# Patient Record
Sex: FEMALE | Race: WHITE | NOT HISPANIC OR LATINO
[De-identification: names, ages, dates, MRNs, and addresses within clinical notes are randomized per-mention and may not be internally consistent; named-entity substitution may affect disease eponyms.]

---

## 2018-11-02 PROBLEM — Z00.00 ENCOUNTER FOR PREVENTIVE HEALTH EXAMINATION: Status: ACTIVE | Noted: 2018-11-02

## 2018-11-18 ENCOUNTER — RECORD ABSTRACTING (OUTPATIENT)
Age: 73
End: 2018-11-18

## 2018-11-18 DIAGNOSIS — K29.50 UNSPECIFIED CHRONIC GASTRITIS W/OUT BLEEDING: ICD-10-CM

## 2018-11-18 DIAGNOSIS — K57.30 DIVERTICULOSIS OF LARGE INTESTINE W/OUT PERFORATION OR ABSCESS W/OUT BLEEDING: ICD-10-CM

## 2018-11-18 DIAGNOSIS — Z71.89 OTHER SPECIFIED COUNSELING: ICD-10-CM

## 2018-11-18 DIAGNOSIS — Z80.6 FAMILY HISTORY OF LEUKEMIA: ICD-10-CM

## 2018-11-18 RX ORDER — B-COMPLEX WITH VITAMIN C
TABLET ORAL DAILY
Refills: 0 | Status: ACTIVE | COMMUNITY

## 2018-11-18 RX ORDER — ASPIRIN 81 MG/1
81 TABLET, CHEWABLE ORAL DAILY
Refills: 0 | Status: ACTIVE | COMMUNITY

## 2018-12-10 ENCOUNTER — APPOINTMENT (OUTPATIENT)
Dept: GERIATRICS | Facility: CLINIC | Age: 73
End: 2018-12-10

## 2019-01-10 ENCOUNTER — RESULT REVIEW (OUTPATIENT)
Age: 74
End: 2019-01-10

## 2019-01-14 ENCOUNTER — APPOINTMENT (OUTPATIENT)
Dept: GERIATRICS | Facility: CLINIC | Age: 74
End: 2019-01-14
Payer: MEDICARE

## 2019-01-14 VITALS
OXYGEN SATURATION: 98 % | SYSTOLIC BLOOD PRESSURE: 142 MMHG | HEART RATE: 71 BPM | DIASTOLIC BLOOD PRESSURE: 80 MMHG | TEMPERATURE: 97.8 F | HEIGHT: 72 IN

## 2019-01-14 PROCEDURE — 99213 OFFICE O/P EST LOW 20 MIN: CPT

## 2019-03-13 ENCOUNTER — RECORD ABSTRACTING (OUTPATIENT)
Age: 74
End: 2019-03-13

## 2019-03-13 LAB — CYTOLOGY CVX/VAG DOC THIN PREP: NORMAL

## 2019-05-10 ENCOUNTER — RESULT REVIEW (OUTPATIENT)
Age: 74
End: 2019-05-10

## 2019-05-13 ENCOUNTER — APPOINTMENT (OUTPATIENT)
Dept: GERIATRICS | Facility: CLINIC | Age: 74
End: 2019-05-13
Payer: MEDICARE

## 2019-05-13 VITALS
HEART RATE: 80 BPM | OXYGEN SATURATION: 99 % | HEIGHT: 66 IN | DIASTOLIC BLOOD PRESSURE: 76 MMHG | TEMPERATURE: 97.9 F | SYSTOLIC BLOOD PRESSURE: 140 MMHG

## 2019-05-13 PROCEDURE — 99214 OFFICE O/P EST MOD 30 MIN: CPT

## 2019-05-14 NOTE — PHYSICAL EXAM
[General Appearance - Alert] : alert [General Appearance - In No Acute Distress] : in no acute distress [General Appearance - Well-Appearing] : healthy appearing [Sclera] : the sclera and conjunctiva were normal [PERRL With Normal Accommodation] : pupils were equal in size, round, and reactive to light [Optic Disc Abnormality] : the optic disc were normal in size and color [Extraocular Movements] : extraocular movements were intact [Neck Appearance] : the appearance of the neck was normal [Neck Cervical Mass (___cm)] : no neck mass was observed [Respiration, Rhythm And Depth] : normal respiratory rhythm and effort [Chest Palpation] : palpation of the chest revealed no abnormalities [Auscultation Breath Sounds / Voice Sounds] : lungs were clear to auscultation bilaterally [Lungs Percussion] : the lungs were normal to percussion [Heart Rate And Rhythm] : heart rate was normal and rhythm regular [Apical Impulse] : the apical impulse was normal [Heart Sounds] : normal S1 and S2 [Heart Sounds Gallop] : no gallops [Heart Sounds Pericardial Friction Rub] : no pericardial rub [Murmurs] : no murmurs [Full Pulse] : the pedal pulses are present [Arterial Pulses Carotid] : carotid pulses were normal with no bruits [Edema] : there was no peripheral edema [Arterial Pulses Femoral] : femoral pulses were normal without bruits [Bowel Sounds] : normal bowel sounds [Abdomen Soft] : soft [Abdomen Mass (___ Cm)] : no abdominal mass palpated [Nail Clubbing] : no clubbing  or cyanosis of the fingernails [Abnormal Walk] : normal gait [Involuntary Movements] : no involuntary movements were seen [Musculoskeletal - Swelling] : no joint swelling seen [Motor Tone] : muscle strength and tone were normal [] : no rash [Skin Lesions] : no skin lesions [Deep Tendon Reflexes (DTR)] : deep tendon reflexes were 2+ and symmetric [Cranial Nerves] : cranial nerves 2-12 were intact [Motor Exam] : the motor exam was normal [Sensation] : the sensory exam was normal to light touch and pinprick [No Focal Deficits] : no focal deficits [Affect] : the affect was normal [Impaired Insight] : insight and judgment were intact [Mood] : the mood was normal [Memory Recent] : recent memory was not impaired

## 2019-05-14 NOTE — PHYSICAL EXAM
[General Appearance - Alert] : alert [General Appearance - In No Acute Distress] : in no acute distress [General Appearance - Well-Appearing] : healthy appearing [Sclera] : the sclera and conjunctiva were normal [Optic Disc Abnormality] : the optic disc were normal in size and color [PERRL With Normal Accommodation] : pupils were equal in size, round, and reactive to light [Extraocular Movements] : extraocular movements were intact [Neck Appearance] : the appearance of the neck was normal [Neck Cervical Mass (___cm)] : no neck mass was observed [Respiration, Rhythm And Depth] : normal respiratory rhythm and effort [Chest Palpation] : palpation of the chest revealed no abnormalities [Auscultation Breath Sounds / Voice Sounds] : lungs were clear to auscultation bilaterally [Lungs Percussion] : the lungs were normal to percussion [Heart Rate And Rhythm] : heart rate was normal and rhythm regular [Apical Impulse] : the apical impulse was normal [Heart Sounds Gallop] : no gallops [Heart Sounds] : normal S1 and S2 [Murmurs] : no murmurs [Heart Sounds Pericardial Friction Rub] : no pericardial rub [Full Pulse] : the pedal pulses are present [Arterial Pulses Femoral] : femoral pulses were normal without bruits [Arterial Pulses Carotid] : carotid pulses were normal with no bruits [Edema] : there was no peripheral edema [Bowel Sounds] : normal bowel sounds [Abdomen Mass (___ Cm)] : no abdominal mass palpated [Abdomen Soft] : soft [Involuntary Movements] : no involuntary movements were seen [Nail Clubbing] : no clubbing  or cyanosis of the fingernails [Abnormal Walk] : normal gait [Musculoskeletal - Swelling] : no joint swelling seen [Motor Tone] : muscle strength and tone were normal [Skin Lesions] : no skin lesions [] : no rash [Cranial Nerves] : cranial nerves 2-12 were intact [Deep Tendon Reflexes (DTR)] : deep tendon reflexes were 2+ and symmetric [Sensation] : the sensory exam was normal to light touch and pinprick [No Focal Deficits] : no focal deficits [Motor Exam] : the motor exam was normal [Affect] : the affect was normal [Impaired Insight] : insight and judgment were intact [Memory Recent] : recent memory was not impaired [Mood] : the mood was normal

## 2019-07-08 NOTE — HISTORY OF PRESENT ILLNESS
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

## 2019-07-08 NOTE — HISTORY OF PRESENT ILLNESS
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

## 2019-07-08 NOTE — HISTORY OF PRESENT ILLNESS
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

## 2019-07-08 NOTE — PHYSICAL EXAM
[General Appearance - Alert] : alert [General Appearance - In No Acute Distress] : in no acute distress [General Appearance - Well-Appearing] : healthy appearing [Sclera] : the sclera and conjunctiva were normal [PERRL With Normal Accommodation] : pupils were equal in size, round, and reactive to light [Extraocular Movements] : extraocular movements were intact [Optic Disc Abnormality] : the optic disc were normal in size and color [Neck Appearance] : the appearance of the neck was normal [Neck Cervical Mass (___cm)] : no neck mass was observed [Respiration, Rhythm And Depth] : normal respiratory rhythm and effort [Auscultation Breath Sounds / Voice Sounds] : lungs were clear to auscultation bilaterally [Chest Palpation] : palpation of the chest revealed no abnormalities [Lungs Percussion] : the lungs were normal to percussion [Apical Impulse] : the apical impulse was normal [Heart Rate And Rhythm] : heart rate was normal and rhythm regular [Heart Sounds] : normal S1 and S2 [Murmurs] : no murmurs [Heart Sounds Gallop] : no gallops [Heart Sounds Pericardial Friction Rub] : no pericardial rub [Arterial Pulses Carotid] : carotid pulses were normal with no bruits [Arterial Pulses Femoral] : femoral pulses were normal without bruits [Full Pulse] : the pedal pulses are present [Edema] : there was no peripheral edema [Bowel Sounds] : normal bowel sounds [Abdomen Soft] : soft [Abdomen Mass (___ Cm)] : no abdominal mass palpated [Abnormal Walk] : normal gait [Nail Clubbing] : no clubbing  or cyanosis of the fingernails [Involuntary Movements] : no involuntary movements were seen [Musculoskeletal - Swelling] : no joint swelling seen [Motor Tone] : muscle strength and tone were normal [] : no rash [Skin Lesions] : no skin lesions [Deep Tendon Reflexes (DTR)] : deep tendon reflexes were 2+ and symmetric [Cranial Nerves] : cranial nerves 2-12 were intact [Sensation] : the sensory exam was normal to light touch and pinprick [No Focal Deficits] : no focal deficits [Motor Exam] : the motor exam was normal [Impaired Insight] : insight and judgment were intact [Affect] : the affect was normal [Mood] : the mood was normal [Memory Recent] : recent memory was not impaired

## 2019-07-08 NOTE — SOCIAL HISTORY
[Fully functional (bathing, dressing, toileting, transferring, walking, feeding)] : Fully functional (bathing, dressing, toileting, transferring, walking, feeding) [No falls in past year] : Patient reported no falls in the past year [Fully functional (using the telephone, shopping, preparing meals, housekeeping, doing laundry, using transportation,] : Fully functional and needs no help or supervision to perform IADLs (using the telephone, shopping, preparing meals, housekeeping, doing laundry, using transportation, managing medications and managing finances) [Smoke Detector] : smoke detector [Carbon Monoxide Detector] : carbon monoxide detector [Night Light] : night light [Seat Belt] :  uses seat belt [Driving] : driving [Guns at Home] : no guns at home

## 2019-07-08 NOTE — REASON FOR VISIT
[Pre-Visit Preparation] : pre-visit preparation was done [Follow-Up] : a follow-up visit [Intercurrent Specialty/Sub-specialty Visits] : the patient has no intercurrent specialty/sub-specialty visits

## 2019-07-08 NOTE — SOCIAL HISTORY
[Fully functional (bathing, dressing, toileting, transferring, walking, feeding)] : Fully functional (bathing, dressing, toileting, transferring, walking, feeding) [No falls in past year] : Patient reported no falls in the past year [Fully functional (using the telephone, shopping, preparing meals, housekeeping, doing laundry, using transportation,] : Fully functional and needs no help or supervision to perform IADLs (using the telephone, shopping, preparing meals, housekeeping, doing laundry, using transportation, managing medications and managing finances) [Smoke Detector] : smoke detector [Carbon Monoxide Detector] : carbon monoxide detector [Seat Belt] :  uses seat belt [Night Light] : night light [Driving] : driving [Guns at Home] : no guns at home

## 2019-08-23 ENCOUNTER — RESULT REVIEW (OUTPATIENT)
Age: 74
End: 2019-08-23

## 2019-09-09 ENCOUNTER — APPOINTMENT (OUTPATIENT)
Dept: GERIATRICS | Facility: CLINIC | Age: 74
End: 2019-09-09
Payer: MEDICARE

## 2019-09-09 VITALS
OXYGEN SATURATION: 98 % | TEMPERATURE: 97.9 F | HEART RATE: 80 BPM | SYSTOLIC BLOOD PRESSURE: 138 MMHG | DIASTOLIC BLOOD PRESSURE: 80 MMHG

## 2019-09-09 DIAGNOSIS — G47.62 SLEEP RELATED LEG CRAMPS: ICD-10-CM

## 2019-09-09 PROCEDURE — 99214 OFFICE O/P EST MOD 30 MIN: CPT

## 2019-09-16 ENCOUNTER — APPOINTMENT (OUTPATIENT)
Dept: RHEUMATOLOGY | Facility: CLINIC | Age: 74
End: 2019-09-16
Payer: MEDICARE

## 2019-09-16 ENCOUNTER — LABORATORY RESULT (OUTPATIENT)
Age: 74
End: 2019-09-16

## 2019-09-16 ENCOUNTER — APPOINTMENT (OUTPATIENT)
Dept: RHEUMATOLOGY | Facility: CLINIC | Age: 74
End: 2019-09-16

## 2019-09-16 VITALS
SYSTOLIC BLOOD PRESSURE: 120 MMHG | WEIGHT: 220 LBS | BODY MASS INDEX: 35.36 KG/M2 | HEIGHT: 66 IN | DIASTOLIC BLOOD PRESSURE: 70 MMHG

## 2019-09-16 DIAGNOSIS — M79.643 PAIN IN UNSPECIFIED HAND: ICD-10-CM

## 2019-09-16 PROCEDURE — 36415 COLL VENOUS BLD VENIPUNCTURE: CPT

## 2019-09-16 PROCEDURE — 99205 OFFICE O/P NEW HI 60 MIN: CPT | Mod: 25

## 2019-09-16 RX ORDER — HYDROCORTISONE ACETATE 0.5 %
CREAM (GRAM) TOPICAL
Refills: 0 | Status: DISCONTINUED | COMMUNITY
End: 2019-09-16

## 2019-09-17 ENCOUNTER — RESULT REVIEW (OUTPATIENT)
Age: 74
End: 2019-09-17

## 2019-09-17 LAB
ALBUMIN SERPL ELPH-MCNC: 4.8 G/DL
ALP BLD-CCNC: 84 U/L
ALT SERPL-CCNC: 22 U/L
ANION GAP SERPL CALC-SCNC: 14 MMOL/L
AST SERPL-CCNC: 18 U/L
BASOPHILS # BLD AUTO: 0.05 K/UL
BASOPHILS NFR BLD AUTO: 0.6 %
BILIRUB SERPL-MCNC: 0.3 MG/DL
BUN SERPL-MCNC: 14 MG/DL
CALCIUM SERPL-MCNC: 9.8 MG/DL
CCP AB SER IA-ACNC: <8 UNITS
CHLORIDE SERPL-SCNC: 102 MMOL/L
CO2 SERPL-SCNC: 25 MMOL/L
CREAT SERPL-MCNC: 0.63 MG/DL
CRP SERPL-MCNC: 0.26 MG/DL
DSDNA AB SER-ACNC: 12 IU/ML
EOSINOPHIL # BLD AUTO: 0.02 K/UL
EOSINOPHIL NFR BLD AUTO: 0.2 %
GLUCOSE SERPL-MCNC: 95 MG/DL
HCT VFR BLD CALC: 43 %
HGB BLD-MCNC: 13.2 G/DL
IMM GRANULOCYTES NFR BLD AUTO: 0.1 %
LYMPHOCYTES # BLD AUTO: 2.82 K/UL
LYMPHOCYTES NFR BLD AUTO: 33.5 %
MAN DIFF?: NORMAL
MCHC RBC-ENTMCNC: 30.1 PG
MCHC RBC-ENTMCNC: 30.7 GM/DL
MCV RBC AUTO: 97.9 FL
MONOCYTES # BLD AUTO: 0.6 K/UL
MONOCYTES NFR BLD AUTO: 7.1 %
MPO AB + PR3 PNL SER: NORMAL
NEUTROPHILS # BLD AUTO: 4.93 K/UL
NEUTROPHILS NFR BLD AUTO: 58.5 %
PLATELET # BLD AUTO: 317 K/UL
POTASSIUM SERPL-SCNC: 4.3 MMOL/L
PROT SERPL-MCNC: 7.8 G/DL
RBC # BLD: 4.39 M/UL
RBC # FLD: 12.9 %
RF+CCP IGG SER-IMP: NEGATIVE
SODIUM SERPL-SCNC: 141 MMOL/L
WBC # FLD AUTO: 8.43 K/UL

## 2019-09-17 NOTE — SOCIAL HISTORY
[No falls in past year] : Patient reported no falls in the past year [Fully functional (bathing, dressing, toileting, transferring, walking, feeding)] : Fully functional (bathing, dressing, toileting, transferring, walking, feeding) [Fully functional (using the telephone, shopping, preparing meals, housekeeping, doing laundry, using transportation,] : Fully functional and needs no help or supervision to perform IADLs (using the telephone, shopping, preparing meals, housekeeping, doing laundry, using transportation, managing medications and managing finances) No complaints

## 2019-09-18 NOTE — DATA REVIEWED
[FreeTextEntry1] : labs from 7/17/19 reviewed:RPR nonreactive, quantiferon gold negative, lyme negative, CBC normal, ESR 2, ACE normal, RF negative, REJI 1:160 DFS70, HLA B27 negative

## 2019-09-18 NOTE — HISTORY OF PRESENT ILLNESS
[FreeTextEntry1] : 75 yo female referred by Dr. Reese for evaluation of + REJI.\par In June she woke up one morning with a red and scratchy eye.  3 days later, she saw an ophthalmologist (Dr. Cordero) and was diagnosed with left iritis.  She was treated with steroid drops, and the inflammation resolved.  12-15 days later, the inflammation returned.  She saw Dr. Cordero who ordered labs, which showed + REJI.  She was put back on the steroid drops (4 times a day, then 3 times a day, then 2 times a day, then 1 time a day, each for 2 weeks).  One day she woke up and the other eye was red.  By the time she saw the ophthalmologist 3 days later, the redness was gone.  He assumed it was dry eyes and told her to use drops.\par Symptoms (redness and irritation) improved within a few days.  She does have exposure to dust.\par If she sits for 10-15 minutes, her joints feel stiff.  + morning stiffness.  She feels better once moving.  She feels achy in the morning.  She wakes up with her right hand swollen and she can't close it.  No rashes.  No oral ulcers.  No dry eyes or mouth.  No Raynauds.  \par No recent travel.\par No FH of autoimmune disease.\par Multiple times since 1974 she had episodes of back pain.  She was told she had degenerative discs.

## 2019-09-18 NOTE — ASSESSMENT
[FreeTextEntry1] : Enma presents for autoimmune workup after second episode of iritis.  It is unclear if the second episode represents a new episode (i.e. recurrence) or incomplete treatment of the initial episode.  Will complete serologic work-up, including xray of SI joint.  Iritis is often the presenting symptom of seronegative spondyloarthropathy and she has localized pain over her right SI joint.  Limited ROM of wrists may indicate longstanding subclinical inflammation.

## 2019-09-18 NOTE — REVIEW OF SYSTEMS
[Eye Pain] : eye pain [Red Eyes] : red eyes [Joint Pain] : joint pain [Joint Stiffness] : joint stiffness [Joint Swelling] : joint swelling [Negative] : Heme/Lymph

## 2019-09-19 ENCOUNTER — APPOINTMENT (OUTPATIENT)
Dept: GERIATRICS | Facility: CLINIC | Age: 74
End: 2019-09-19
Payer: MEDICARE

## 2019-09-19 ENCOUNTER — RESULT REVIEW (OUTPATIENT)
Age: 74
End: 2019-09-19

## 2019-09-19 VITALS
BODY MASS INDEX: 34.86 KG/M2 | DIASTOLIC BLOOD PRESSURE: 73 MMHG | WEIGHT: 216 LBS | TEMPERATURE: 98.1 F | SYSTOLIC BLOOD PRESSURE: 138 MMHG | HEART RATE: 87 BPM | OXYGEN SATURATION: 96 %

## 2019-09-19 LAB
ANA SER IF-ACNC: NEGATIVE
CHROMATIN AB SERPL-ACNC: <0.2 AL
ENA JO1 AB SER IA-ACNC: <0.2 AL
ENA RNP AB SER IA-ACNC: <0.2 AL
ENA SM AB SER IA-ACNC: <0.2 AL
ENA SS-A AB SER IA-ACNC: <0.2 AL
ENA SS-B AB SER IA-ACNC: <0.2 AL
RNA POLYMERASE III IGG: <10 U

## 2019-09-19 PROCEDURE — 99213 OFFICE O/P EST LOW 20 MIN: CPT

## 2019-09-19 NOTE — ASSESSMENT
[FreeTextEntry1] : Back pain: Patient complained of right rib pain for which she was concerned about her gallbladder.  Her exam does not suggest gallbladder pathology as she has no abdominal pain with palpation and - Thurston sign.  There is reproducible musculoskeletal pain at the right thoracic costovertebral and costotransverse joints.  Will obtain an xray to r/o a slipped rib.  Given recent xrays this may represent spondyloarthritis.  Advised patient that she may take NSAIDs for her pain with continued use of her PPI.  Advised her to contact myself or Dr. Reese if symptoms worsen, change, or do not improve.  Also advised to contact should she develop RUQ associated with N/v, fever and/or chills. \par \par Follow up as needed.

## 2019-09-19 NOTE — REVIEW OF SYSTEMS
[Joint Pain] : joint pain [Joint Stiffness] : joint stiffness [Back Pain] : back pain [Negative] : Musculoskeletal [FreeTextEntry7] : ?RUQ discomfort

## 2019-09-19 NOTE — PHYSICAL EXAM
[No Acute Distress] : no acute distress [Well-Appearing] : well-appearing [Normal Sclera/Conjunctiva] : normal sclera/conjunctiva [PERRL] : pupils equal round and reactive to light [EOMI] : extraocular movements intact [No Respiratory Distress] : no respiratory distress  [Clear to Auscultation] : lungs were clear to auscultation bilaterally [Normal Rate] : normal rate  [Regular Rhythm] : with a regular rhythm [Normal S1, S2] : normal S1 and S2 [Soft] : abdomen soft [Non Tender] : non-tender [Non-distended] : non-distended [Normal Bowel Sounds] : normal bowel sounds [No Joint Swelling] : no joint swelling [Grossly Normal Strength/Tone] : grossly normal strength/tone [No Focal Deficits] : no focal deficits [Normal Gait] : normal gait [Normal Affect] : the affect was normal [Alert and Oriented x3] : oriented to person, place, and time [Normal Insight/Judgement] : insight and judgment were intact [de-identified] : Negative Thurston sign [de-identified] : Reproducible pain on the right thoracic costovertebral and costotransverse joints [de-identified] : No reproducible pain on the anterior rib cage.

## 2019-09-19 NOTE — HISTORY OF PRESENT ILLNESS
[FreeTextEntry8] : 74 year old female with a history of HTN, HLD, iritis, osteoporosis, chronic gastritis, vitamin B12 and D deficiencies, and back pain who presents today for an acute visit. \par \par Yesterday patient was attending a .  On her way home around 3 PM, she started having discomfort on her right rib cage at the level of her bra strap.  Also felt a "little queasy."  She has not eaten or drank anything since yesterday as she is concerned it may be her gallbladder.  Patient states she has intermittent pain in the RUQ, had an US in 2016 which demonstrated no gallstones, or thickening.  Denies any nausea or vomiting, fever.  Felt "a little chilly" last night, checked her temp which was normal.  \par \par Of note, patient recently consulted with Dr. Rodriguez for evaluation of +REJI after a diagnosis of left iritis.  Repeat REJI negative however xray of SI joint, wrists and hands demonstrated joint space narrowing which may be seen in the setting of inflammatory arthropathy.

## 2019-09-19 NOTE — HEALTH RISK ASSESSMENT
[] : No [Yes] : Yes [Monthly or less (1 pt)] : Monthly or less (1 point) [1 or 2 (0 pts)] : 1 or 2 (0 points) [Never (0 pts)] : Never (0 points) [No] : In the past 12 months have you used drugs other than those required for medical reasons? No [0] : 2) Feeling down, depressed, or hopeless: Not at all (0) [Audit-CScore] : 1 [OOC6Eymbu] : 0

## 2019-10-01 ENCOUNTER — RESULT REVIEW (OUTPATIENT)
Age: 74
End: 2019-10-01

## 2019-10-04 ENCOUNTER — APPOINTMENT (OUTPATIENT)
Dept: RHEUMATOLOGY | Facility: CLINIC | Age: 74
End: 2019-10-04
Payer: MEDICARE

## 2019-10-04 VITALS
SYSTOLIC BLOOD PRESSURE: 120 MMHG | BODY MASS INDEX: 34.72 KG/M2 | HEIGHT: 66 IN | DIASTOLIC BLOOD PRESSURE: 70 MMHG | WEIGHT: 216 LBS

## 2019-10-04 PROCEDURE — 99214 OFFICE O/P EST MOD 30 MIN: CPT

## 2019-10-06 PROBLEM — G47.62 NOCTURNAL LEG CRAMPS: Status: ACTIVE | Noted: 2019-05-14

## 2019-10-06 NOTE — HISTORY OF PRESENT ILLNESS
[0] : 2) Feeling down, depressed, or hopeless: Not at all [PHQ-2 Score ___] : PHQ-2 Score [unfilled] [FreeTextEntry1] : recent issues:\par \par problem with left eye, woke up with eye red, scratchy in [place] seen by dr liriano and diagnosed with iritis, given topical steroids with improvement but after d/c returned 2 weeks later and started on steroids again, dr liriano had suggested blood work and consult with rheumatologist which she has not done as of yet\par \par woke this AM with right eye also red, no pain\par \par otherwise stable\par

## 2019-10-06 NOTE — REVIEW OF SYSTEMS
[Red Eyes] : red eyes [Negative] : Endocrine [de-identified] : some stress with  [FreeTextEntry9] : leg cramping

## 2019-10-06 NOTE — PHYSICAL EXAM
[General Appearance - Well-Appearing] : healthy appearing [General Appearance - Alert] : alert [General Appearance - In No Acute Distress] : in no acute distress [Sclera] : the sclera and conjunctiva were normal [PERRL With Normal Accommodation] : pupils were equal in size, round, and reactive to light [Extraocular Movements] : extraocular movements were intact [Optic Disc Abnormality] : the optic disc were normal in size and color [Neck Appearance] : the appearance of the neck was normal [Neck Cervical Mass (___cm)] : no neck mass was observed [Auscultation Breath Sounds / Voice Sounds] : lungs were clear to auscultation bilaterally [Chest Palpation] : palpation of the chest revealed no abnormalities [Respiration, Rhythm And Depth] : normal respiratory rhythm and effort [Heart Rate And Rhythm] : heart rate was normal and rhythm regular [Apical Impulse] : the apical impulse was normal [Lungs Percussion] : the lungs were normal to percussion [Heart Sounds] : normal S1 and S2 [Murmurs] : no murmurs [Heart Sounds Gallop] : no gallops [Heart Sounds Pericardial Friction Rub] : no pericardial rub [Arterial Pulses Carotid] : carotid pulses were normal with no bruits [Full Pulse] : the pedal pulses are present [Arterial Pulses Femoral] : femoral pulses were normal without bruits [Edema] : there was no peripheral edema [Abdomen Mass (___ Cm)] : no abdominal mass palpated [Bowel Sounds] : normal bowel sounds [Abdomen Soft] : soft [Nail Clubbing] : no clubbing  or cyanosis of the fingernails [Abnormal Walk] : normal gait [Musculoskeletal - Swelling] : no joint swelling seen [Involuntary Movements] : no involuntary movements were seen [Motor Tone] : muscle strength and tone were normal [] : no rash [Skin Lesions] : no skin lesions [Cranial Nerves] : cranial nerves 2-12 were intact [Deep Tendon Reflexes (DTR)] : deep tendon reflexes were 2+ and symmetric [Sensation] : the sensory exam was normal to light touch and pinprick [Motor Exam] : the motor exam was normal [No Focal Deficits] : no focal deficits [Impaired Insight] : insight and judgment were intact [Affect] : the affect was normal [Mood] : the mood was normal [Memory Recent] : recent memory was not impaired [FreeTextEntry1] : right eye-erythema

## 2019-10-07 NOTE — HISTORY OF PRESENT ILLNESS
[FreeTextEntry1] : 2 days after her last appointment she developed RUQ pain and nausea. She saw Dr. Reese's PA.  Her exam was benign. She took Aleve for 3 days, and it improved.\par She reports stiffness in her joints, especially when standing from a seated position.  She restarted Glucosamine.\par No episodes of eye inflammation but she feels like her eyes are pink.\par She drinks diet tonic water for leg cramps and rubs [employer] on her legs.

## 2019-10-07 NOTE — ASSESSMENT
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  Iritis is often the presenting symptom of seronegative spondyloarthropathy.  We have discussed this diagnosis at length, along with the treatment paradigm.    Side effect profile of Sulfasalazine discussed, along with the need to check laboratory parameters every 6-8 weeks to assess for drug toxicity.\par

## 2019-10-15 ENCOUNTER — MESSAGE (OUTPATIENT)
Age: 74
End: 2019-10-15

## 2019-11-08 ENCOUNTER — RESULT REVIEW (OUTPATIENT)
Age: 74
End: 2019-11-08

## 2019-11-11 ENCOUNTER — APPOINTMENT (OUTPATIENT)
Dept: GERIATRICS | Facility: CLINIC | Age: 74
End: 2019-11-11
Payer: MEDICARE

## 2019-11-11 VITALS
SYSTOLIC BLOOD PRESSURE: 138 MMHG | OXYGEN SATURATION: 97 % | HEART RATE: 82 BPM | TEMPERATURE: 97.9 F | DIASTOLIC BLOOD PRESSURE: 78 MMHG

## 2019-11-11 DIAGNOSIS — J32.0 CHRONIC MAXILLARY SINUSITIS: ICD-10-CM

## 2019-11-11 PROCEDURE — 99214 OFFICE O/P EST MOD 30 MIN: CPT

## 2019-11-11 NOTE — SOCIAL HISTORY
[No falls in past year] : Patient reported no falls in the past year [Fully functional (using the telephone, shopping, preparing meals, housekeeping, doing laundry, using transportation,] : Fully functional and needs no help or supervision to perform IADLs (using the telephone, shopping, preparing meals, housekeeping, doing laundry, using transportation, managing medications and managing finances) [Fully functional (bathing, dressing, toileting, transferring, walking, feeding)] : Fully functional (bathing, dressing, toileting, transferring, walking, feeding) [Smoke Detector] : smoke detector [Carbon Monoxide Detector] : carbon monoxide detector [Seat Belt] :  uses seat belt [Driving] : driving

## 2019-11-12 PROBLEM — J32.0 CHRONIC LEFT MAXILLARY SINUSITIS: Status: ACTIVE | Noted: 2019-11-11

## 2019-11-12 NOTE — REVIEW OF SYSTEMS
[Red Eyes] : red eyes [Negative] : Heme/Lymph [FreeTextEntry9] : leg cramping [de-identified] : some stress with

## 2019-11-12 NOTE — HISTORY OF PRESENT ILLNESS
[PHQ-2 Score ___] : PHQ-2 Score [unfilled] [0] : 2) Feeling down, depressed, or hopeless: Not at all [FreeTextEntry1] : seen again  by dr liriano due to recurrent iritis left eye,  eye drops changed, follow up next week (3 weeks later)\par \par seen by dr macias- diagnosed with seronegative spondyloarthropathy- started on sulfasalazine,  pt stating feels better, less stiff, mild nausea but denies any other side effects, appetite good, follow up with dr macias 12/2.\par \par labs done last week-need to review with pt\par \par pt c/o slight cough, no sob, no diff breathing, no fever

## 2019-12-02 ENCOUNTER — APPOINTMENT (OUTPATIENT)
Dept: RHEUMATOLOGY | Facility: CLINIC | Age: 74
End: 2019-12-02
Payer: MEDICARE

## 2019-12-02 VITALS
DIASTOLIC BLOOD PRESSURE: 70 MMHG | WEIGHT: 216 LBS | SYSTOLIC BLOOD PRESSURE: 136 MMHG | HEIGHT: 66 IN | BODY MASS INDEX: 34.72 KG/M2

## 2019-12-02 PROCEDURE — 99214 OFFICE O/P EST MOD 30 MIN: CPT | Mod: 25

## 2019-12-02 PROCEDURE — 36415 COLL VENOUS BLD VENIPUNCTURE: CPT

## 2019-12-02 NOTE — HISTORY OF PRESENT ILLNESS
[FreeTextEntry1] : She has been taking Sulfasalazine for 2 months.  She feels not as stiff as she usually is.  She notes improved mobility.\par She does think she is constipated while taking Sulfasalazine.  She had a recurrence of the iritis but it was not anywhere as bad. This time her eye got red but did not hurt or have the sintia sensation.  She was restarted on steroid drops.  She was told she may need a steroid drop or a very long time once a day.  \par When she saw Dr. Reese she was told her blood sugar was very high and that she would be started on metformin at her next visit if her blood sugars did not improve.
none

## 2019-12-02 NOTE — ASSESSMENT
[FreeTextEntry1] : we discussed switching to Humira given recurrence of iritis and persistence of synovitis.\par She is reluctant to start Humira.  Will increase Sulfasalazine to 3 times a day (worried about constipation).  We discussed regimen of increased water intake and Colace to counteract constipation.

## 2019-12-03 LAB
25(OH)D3 SERPL-MCNC: 38.8 NG/ML
ALBUMIN SERPL ELPH-MCNC: 4.7 G/DL
ALP BLD-CCNC: 81 U/L
ALT SERPL-CCNC: 20 U/L
ANION GAP SERPL CALC-SCNC: 15 MMOL/L
AST SERPL-CCNC: 19 U/L
BASOPHILS # BLD AUTO: 0.05 K/UL
BASOPHILS NFR BLD AUTO: 0.8 %
BILIRUB SERPL-MCNC: 0.2 MG/DL
BUN SERPL-MCNC: 13 MG/DL
CALCIUM SERPL-MCNC: 9.8 MG/DL
CHLORIDE SERPL-SCNC: 103 MMOL/L
CO2 SERPL-SCNC: 24 MMOL/L
CREAT SERPL-MCNC: 0.64 MG/DL
CRP SERPL-MCNC: 0.2 MG/DL
EOSINOPHIL # BLD AUTO: 0.1 K/UL
EOSINOPHIL NFR BLD AUTO: 1.7 %
ERYTHROCYTE [SEDIMENTATION RATE] IN BLOOD BY WESTERGREN METHOD: 11 MM/HR
GLUCOSE SERPL-MCNC: 114 MG/DL
HCT VFR BLD CALC: 41.4 %
HGB BLD-MCNC: 13.3 G/DL
IMM GRANULOCYTES NFR BLD AUTO: 0.3 %
LYMPHOCYTES # BLD AUTO: 2.09 K/UL
LYMPHOCYTES NFR BLD AUTO: 35.3 %
MAN DIFF?: NORMAL
MCHC RBC-ENTMCNC: 31.1 PG
MCHC RBC-ENTMCNC: 32.1 GM/DL
MCV RBC AUTO: 96.7 FL
MONOCYTES # BLD AUTO: 0.57 K/UL
MONOCYTES NFR BLD AUTO: 9.6 %
NEUTROPHILS # BLD AUTO: 3.09 K/UL
NEUTROPHILS NFR BLD AUTO: 52.3 %
PLATELET # BLD AUTO: 330 K/UL
POTASSIUM SERPL-SCNC: 4.6 MMOL/L
PROT SERPL-MCNC: 7.1 G/DL
RBC # BLD: 4.28 M/UL
RBC # FLD: 13.2 %
SODIUM SERPL-SCNC: 142 MMOL/L
WBC # FLD AUTO: 5.92 K/UL

## 2019-12-09 ENCOUNTER — MEDICATION RENEWAL (OUTPATIENT)
Age: 74
End: 2019-12-09

## 2019-12-10 ENCOUNTER — RX RENEWAL (OUTPATIENT)
Age: 74
End: 2019-12-10

## 2019-12-21 ENCOUNTER — RESULT REVIEW (OUTPATIENT)
Age: 74
End: 2019-12-21

## 2019-12-23 ENCOUNTER — APPOINTMENT (OUTPATIENT)
Dept: GERIATRICS | Facility: CLINIC | Age: 74
End: 2019-12-23
Payer: MEDICARE

## 2019-12-23 VITALS — BODY MASS INDEX: 34.38 KG/M2 | DIASTOLIC BLOOD PRESSURE: 74 MMHG | WEIGHT: 213 LBS | SYSTOLIC BLOOD PRESSURE: 150 MMHG

## 2019-12-23 DIAGNOSIS — K22.70 BARRETT'S ESOPHAGUS W/OUT DYSPLASIA: ICD-10-CM

## 2019-12-23 PROCEDURE — 99214 OFFICE O/P EST MOD 30 MIN: CPT

## 2019-12-23 NOTE — HISTORY OF PRESENT ILLNESS
[FreeTextEntry1] : \par states has been doing better with diet, cut down on carbs-eating min amount of bread, thinks has lost small amount of weight\par [id number] on Saturday-need to review results\par \par overdue for EGD-pt aware due in 2018, would like to see Dr. DelgadoQzelych-CsHbfrpw-bbdv see her upon her return to the office\par \par states feeling better since start of sulfasalazine, less aches right hip area, back, hands\par \par seen by both Dr Cordero and Dr Macias-now doen from 4 drops per day left eye to one drop daily, sulfasalazine increased to 3x/day by dr macias at last visit due to still present  synovitis present as well as h/o recurrent iritis, also discussed was possibility of starting Humira-pt very reluctant to start this medication, concerned regarding side effects, would prefer to stay on sulfasalazine if possible

## 2019-12-23 NOTE — REVIEW OF SYSTEMS
[Red Eyes] : red eyes [As Noted in HPI] : as noted in HPI [Negative] : Heme/Lymph [de-identified] : some stress with

## 2020-03-02 ENCOUNTER — APPOINTMENT (OUTPATIENT)
Dept: RHEUMATOLOGY | Facility: CLINIC | Age: 75
End: 2020-03-02
Payer: MEDICARE

## 2020-03-02 VITALS
DIASTOLIC BLOOD PRESSURE: 76 MMHG | HEIGHT: 66 IN | SYSTOLIC BLOOD PRESSURE: 130 MMHG | WEIGHT: 213 LBS | BODY MASS INDEX: 34.23 KG/M2

## 2020-03-02 PROCEDURE — 36415 COLL VENOUS BLD VENIPUNCTURE: CPT

## 2020-03-02 PROCEDURE — 99214 OFFICE O/P EST MOD 30 MIN: CPT | Mod: 25

## 2020-03-03 NOTE — HISTORY OF PRESENT ILLNESS
[FreeTextEntry1] : She is down to one drop of steroids in her left eye and will see Dr. Cordero this week.  No recurrence since last visit 3 months ago.\par \par She takes Sulfasalazine 1 tab TID but she often missed her lunch dose bc she doesn’t eat lunch.\par She stopped Glucosamine bc she felt like she was often choking on it and now no longer has constipation.\par She notes feeling much less stiff and more limber since starting Sulfasalazine

## 2020-03-05 LAB
25(OH)D3 SERPL-MCNC: 38.6 NG/ML
ALBUMIN SERPL ELPH-MCNC: 4.5 G/DL
ALP BLD-CCNC: 78 U/L
ALT SERPL-CCNC: 17 U/L
ANION GAP SERPL CALC-SCNC: 15 MMOL/L
AST SERPL-CCNC: 18 U/L
BASOPHILS # BLD AUTO: 0.04 K/UL
BASOPHILS NFR BLD AUTO: 0.7 %
BILIRUB SERPL-MCNC: 0.2 MG/DL
BUN SERPL-MCNC: 14 MG/DL
CALCIUM SERPL-MCNC: 9.3 MG/DL
CHLORIDE SERPL-SCNC: 103 MMOL/L
CO2 SERPL-SCNC: 24 MMOL/L
CREAT SERPL-MCNC: 0.6 MG/DL
CRP SERPL-MCNC: 0.21 MG/DL
EOSINOPHIL # BLD AUTO: 0.19 K/UL
EOSINOPHIL NFR BLD AUTO: 3.2 %
ERYTHROCYTE [SEDIMENTATION RATE] IN BLOOD BY WESTERGREN METHOD: 4 MM/HR
GLUCOSE SERPL-MCNC: 85 MG/DL
HCT VFR BLD CALC: 40 %
HGB BLD-MCNC: 12.7 G/DL
IMM GRANULOCYTES NFR BLD AUTO: 0.2 %
LYMPHOCYTES # BLD AUTO: 2.15 K/UL
LYMPHOCYTES NFR BLD AUTO: 35.7 %
MAN DIFF?: NORMAL
MCHC RBC-ENTMCNC: 30.8 PG
MCHC RBC-ENTMCNC: 31.8 GM/DL
MCV RBC AUTO: 97.1 FL
MONOCYTES # BLD AUTO: 0.53 K/UL
MONOCYTES NFR BLD AUTO: 8.8 %
NEUTROPHILS # BLD AUTO: 3.11 K/UL
NEUTROPHILS NFR BLD AUTO: 51.4 %
PLATELET # BLD AUTO: 293 K/UL
POTASSIUM SERPL-SCNC: 4.2 MMOL/L
PROT SERPL-MCNC: 7 G/DL
RBC # BLD: 4.12 M/UL
RBC # FLD: 13.1 %
SODIUM SERPL-SCNC: 142 MMOL/L
VIT B12 SERPL-MCNC: 1185 PG/ML
WBC # FLD AUTO: 6.03 K/UL

## 2020-03-31 ENCOUNTER — APPOINTMENT (OUTPATIENT)
Dept: GASTROENTEROLOGY | Facility: CLINIC | Age: 75
End: 2020-03-31

## 2020-06-30 ENCOUNTER — APPOINTMENT (OUTPATIENT)
Dept: GASTROENTEROLOGY | Facility: CLINIC | Age: 75
End: 2020-06-30
Payer: MEDICARE

## 2020-06-30 VITALS
SYSTOLIC BLOOD PRESSURE: 144 MMHG | TEMPERATURE: 97.4 F | HEART RATE: 69 BPM | DIASTOLIC BLOOD PRESSURE: 82 MMHG | WEIGHT: 215 LBS | HEIGHT: 66 IN | BODY MASS INDEX: 34.55 KG/M2

## 2020-06-30 PROCEDURE — 99214 OFFICE O/P EST MOD 30 MIN: CPT

## 2020-06-30 RX ORDER — ASCORBIC ACID 300 MG
TABLET,CHEWABLE ORAL
Refills: 0 | Status: DISCONTINUED | COMMUNITY
Start: 2019-10-04 | End: 2020-06-30

## 2020-06-30 RX ORDER — MOMETASONE 50 UG/1
50 SPRAY, METERED NASAL DAILY
Qty: 1 | Refills: 3 | Status: DISCONTINUED | COMMUNITY
Start: 2019-11-11 | End: 2020-06-30

## 2020-06-30 RX ORDER — CYCLOBENZAPRINE HYDROCHLORIDE 5 MG/1
5 TABLET, FILM COATED ORAL 3 TIMES DAILY
Refills: 0 | Status: DISCONTINUED | COMMUNITY
End: 2020-06-30

## 2020-06-30 NOTE — PHYSICAL EXAM
[General Appearance - Alert] : alert [General Appearance - In No Acute Distress] : in no acute distress [Sclera] : the sclera and conjunctiva were normal [Neck Appearance] : the appearance of the neck was normal [Outer Ear] : the ears and nose were normal in appearance [Apical Impulse] : the apical impulse was normal [] : no respiratory distress [Abdomen Tenderness] : non-tender [Abdomen Soft] : soft [FreeTextEntry1] : deferred to upcoming colonoscopy [Cranial Nerves] : cranial nerves 2-12 were intact [Skin Color & Pigmentation] : normal skin color and pigmentation [Abnormal Walk] : normal gait [Oriented To Time, Place, And Person] : oriented to person, place, and time [Affect] : the affect was normal [Impaired Insight] : insight and judgment were intact [Mood] : the mood was normal

## 2020-06-30 NOTE — ASSESSMENT
[FreeTextEntry1] : patient with chronic reflux- ?BE in 2015, no evidence of BE in 2016. recommend repeat EGD for surveillance. continue BID PPI\par \par recommend colonoscopy in 5 years as patient now has family h/o CRC. patient due now. \par \par Risks (including but not limited to sedation risks, infection, bleeding, perforation, possibility of missed lesions), benefits and alternatives to procedure, including not doing the procedure, were discussed with patient. Patient understood and agreed to proceed with EGD/colonoscopy. \par EGD/Colonoscopy preparation instructions reviewed with patient.\par

## 2020-06-30 NOTE — HISTORY OF PRESENT ILLNESS
[FreeTextEntry1] : 73 yo f htn/hld/dm2, fam hx CRC (1/2 sister passed in 2020 at age 83) , GERD, h/o diverticulitis, osteoporosis, spondyloarthropathy on sulfasalazine (Humira apparently recommended but declined by patient), DJBILLY presents today for the evaluation of colon cancer screening and GERD.\par She is seen at the request of Dr. Jamin Reese.\par \par She previously followed with Dr. aLla. \par \par She takes omeprazole 20 mg BID which has controlled her reflux symptoms well for years.\par If she misses 1 dose, she has recurrent reflux. \par She reports prior episodes of diverticulitis improved with outpatient abx. She had surgical eval with Dr. Hall, but no surgery planned, patient apparently has diverticulosis diffusely. \par \par She has daily regular BM with benefiber. \par \par Patient denies abdominal pain , n/v/ dysphagia/odynophagia, change in bowel habits, diarrhea,  brbpr, melena. no weight loss.  Good appetite and energy level. Patient has daily BM, denies regular NSAID use. \par \par \par  \par record review:\par EGD 3/23/16-perforemd for reflux h/o GIM-4mm patch of gastric mucosa at 37 cm, 1 cm above EGJ. thick mucosa at 38 cm, no nodular, soft on biopsy, 2 cm HH, red streaks in gastric antrum, duodenum not examined. \par -antral mucosa with mild chronic gastritis, negative for H/ pylori. \par -EGJ at 38 cm-mild nonspecific reactive changes, no glandular epithelium, no EoE/dysplasia/malignancy. \par -esophagus ay 37 cm- focal acute esophagitis with invading fungal organisms c/w candida, chronic inflammation of cardiac type gastric mucosa, negative for BE, negative for dysplasia, separate fragment of squamous mucosa with minimal reflux changes. \par \par \par EGD 3/19/2015-for reflux\par 2 cm HH\par path- duodenum- normal\par gastric-strips of antral/body without inflammation.\par GEJ- fragment of squamocolumnar mucosa with extensive incomplete IM, may represent BE.\par fundic mucosa with mild chronic nonactive gastritis without IM. , uninflamed squamous mucosa\par \par Colonoscopy 12/17/2015-multiple large diverticula, distorted lumen, retained scybala-prep was excellent except sigmoid colon. recall 7 years. \par \par \par she believes she had polyps on her first colonoscopy. \par \par \par \par fam hx- crc-1/2 sister

## 2020-07-07 ENCOUNTER — RESULT REVIEW (OUTPATIENT)
Age: 75
End: 2020-07-07

## 2020-07-09 ENCOUNTER — APPOINTMENT (OUTPATIENT)
Dept: GERIATRICS | Facility: CLINIC | Age: 75
End: 2020-07-09
Payer: MEDICARE

## 2020-07-09 VITALS
DIASTOLIC BLOOD PRESSURE: 64 MMHG | OXYGEN SATURATION: 97 % | TEMPERATURE: 96.9 F | SYSTOLIC BLOOD PRESSURE: 142 MMHG | HEART RATE: 77 BPM

## 2020-07-09 DIAGNOSIS — Z87.19 PERSONAL HISTORY OF OTHER DISEASES OF THE DIGESTIVE SYSTEM: ICD-10-CM

## 2020-07-09 DIAGNOSIS — Z72.89 OTHER PROBLEMS RELATED TO LIFESTYLE: ICD-10-CM

## 2020-07-09 DIAGNOSIS — Z87.891 PERSONAL HISTORY OF NICOTINE DEPENDENCE: ICD-10-CM

## 2020-07-09 DIAGNOSIS — Z86.010 PERSONAL HISTORY OF COLONIC POLYPS: ICD-10-CM

## 2020-07-09 PROCEDURE — 99214 OFFICE O/P EST MOD 30 MIN: CPT

## 2020-07-19 NOTE — PHYSICAL EXAM
[General Appearance - Alert] : alert [General Appearance - In No Acute Distress] : in no acute distress [General Appearance - Well-Appearing] : healthy appearing [Sclera] : the sclera and conjunctiva were normal [PERRL With Normal Accommodation] : pupils were equal in size, round, and reactive to light [Optic Disc Abnormality] : the optic disc were normal in size and color [Extraocular Movements] : extraocular movements were intact [Outer Ear] : the ears and nose were normal in appearance [Normal Oral Mucosa] : normal oral mucosa [Hearing Threshold Finger Rub Not [place]] : hearing was normal [Both Tympanic Membranes Were Examined] : both tympanic membranes were normal [Oropharynx] : The oropharynx was normal [Neck Cervical Mass (___cm)] : no neck mass was observed [Neck Appearance] : the appearance of the neck was normal [Respiration, Rhythm And Depth] : normal respiratory rhythm and effort [Chest Palpation] : palpation of the chest revealed no abnormalities [Lungs Percussion] : the lungs were normal to percussion [Auscultation Breath Sounds / Voice Sounds] : lungs were clear to auscultation bilaterally [Heart Rate And Rhythm] : heart rate was normal and rhythm regular [Apical Impulse] : the apical impulse was normal [Heart Sounds Gallop] : no gallops [Heart Sounds] : normal S1 and S2 [Murmurs] : no murmurs [Heart Sounds Pericardial Friction Rub] : no pericardial rub [Arterial Pulses Carotid] : carotid pulses were normal with no bruits [Arterial Pulses Femoral] : femoral pulses were normal without bruits [Full Pulse] : the pedal pulses are present [Edema] : there was no peripheral edema [Bowel Sounds] : normal bowel sounds [Abdomen Soft] : soft [Abdomen Mass (___ Cm)] : no abdominal mass palpated [Cervical Lymph Nodes Enlarged Posterior Bilaterally] : posterior cervical [Supraclavicular Lymph Nodes Enlarged Bilaterally] : supraclavicular [Axillary Lymph Nodes Enlarged Bilaterally] : axillary [No Spinal Tenderness] : no spinal tenderness [Abnormal Walk] : normal gait [Nail Clubbing] : no clubbing  or cyanosis of the fingernails [Involuntary Movements] : no involuntary movements were seen [Skin Lesions] : no skin lesions [Motor Tone] : muscle strength and tone were normal [] : no rash [Deep Tendon Reflexes (DTR)] : deep tendon reflexes were 2+ and symmetric [Sensation] : the sensory exam was normal to light touch and pinprick [Cranial Nerves] : cranial nerves 2-12 were intact [Motor Exam] : the motor exam was normal [No Focal Deficits] : no focal deficits [Impaired Insight] : insight and judgment were intact [Memory Recent] : recent memory was not impaired [Affect] : the affect was normal [Mood] : the mood was normal [FreeTextEntry1] : erythema nasal mucosa left >right

## 2020-07-19 NOTE — ASSESSMENT
[FreeTextEntry1] : 1. will f/u with Dr Pratt-Mireya for EGD/colonoscopy\par \par 2. HgbA1c is improved, discussion re diet, will f/u with next annual visit\par \par 3. f/u scheduled with Dr Macias-symptoms improved, pt admits to less stiffness, eye exam seems stable, awaut guidance regarding medication as per dr macias\par \par labs ordered -4 months

## 2020-07-19 NOTE — HISTORY OF PRESENT ILLNESS
[0] : 2) Feeling down, depressed, or hopeless: Not at all [FreeTextEntry1] : mammo-due August\par \par seen by Dr. Delgado-h/o EGD with questionable pathology for BE in 2015, neg path 2016, family h/o colon cancer therefore colonoscopy due now\par \par Gil--appt pending to have f/u ophtho exam \par \par labs done-f/u increasing  HgbA1c\par \par

## 2020-07-20 ENCOUNTER — APPOINTMENT (OUTPATIENT)
Dept: RHEUMATOLOGY | Facility: CLINIC | Age: 75
End: 2020-07-20
Payer: MEDICARE

## 2020-07-20 PROCEDURE — 99214 OFFICE O/P EST MOD 30 MIN: CPT | Mod: 25

## 2020-07-20 PROCEDURE — 36415 COLL VENOUS BLD VENIPUNCTURE: CPT

## 2020-07-23 NOTE — HISTORY OF PRESENT ILLNESS
[FreeTextEntry1] : She takes SSz 2 tabs BID. She is not as achy.  She does a lot of work outside,  No more swollen joints.  She has not been back to Dr. Cordero.  No new episodes of eye inflammation.  She has  been taking steroid drop once at night bc she feels her eyes are dry and irritated during the summer season due to allergies.  Decreased stiffness.  Her second finger has resolved.

## 2020-08-24 ENCOUNTER — RESULT REVIEW (OUTPATIENT)
Age: 75
End: 2020-08-24

## 2020-08-28 ENCOUNTER — RESULT REVIEW (OUTPATIENT)
Age: 75
End: 2020-08-28

## 2020-08-30 ENCOUNTER — RESULT REVIEW (OUTPATIENT)
Age: 75
End: 2020-08-30

## 2020-08-31 ENCOUNTER — APPOINTMENT (OUTPATIENT)
Dept: GASTROENTEROLOGY | Facility: HOSPITAL | Age: 75
End: 2020-08-31

## 2020-09-30 ENCOUNTER — RESULT REVIEW (OUTPATIENT)
Age: 75
End: 2020-09-30

## 2020-10-16 ENCOUNTER — APPOINTMENT (OUTPATIENT)
Dept: GASTROENTEROLOGY | Facility: CLINIC | Age: 75
End: 2020-10-16
Payer: MEDICARE

## 2020-10-16 VITALS
WEIGHT: 220 LBS | DIASTOLIC BLOOD PRESSURE: 78 MMHG | BODY MASS INDEX: 35.36 KG/M2 | HEIGHT: 66 IN | SYSTOLIC BLOOD PRESSURE: 140 MMHG | HEART RATE: 75 BPM | TEMPERATURE: 97.1 F

## 2020-10-16 DIAGNOSIS — K31.7 POLYP OF STOMACH AND DUODENUM: ICD-10-CM

## 2020-10-16 PROCEDURE — 99214 OFFICE O/P EST MOD 30 MIN: CPT

## 2020-10-17 PROBLEM — K31.7 MULTIPLE GASTRIC POLYPS: Status: ACTIVE | Noted: 2020-10-17

## 2020-10-18 NOTE — ASSESSMENT
[FreeTextEntry1] : Small bowel stricture/Crohns disease- endoscopic and imaging findings consistent with Crohns disease. Patient currently asymptomatic, but discussed possibility of developing obstructive symptoms requiring surgery. \par Recommend treatment with biologic agent, will try to coordinate with rheumatologist to choose medication that would treat small bowel disease and joint disease. \par \par Gastric polyps- multiple polyps >1 cm seen on recent EGD . path consistent with fundic gland polyps. Recommend EGD to complete removal of multiple polyps. \par \par GERD- continue PPI\par \par Colon cancer screening, fam hx CRC- repeat colonoscopy in 3 years. \par \par

## 2020-10-18 NOTE — HISTORY OF PRESENT ILLNESS
[FreeTextEntry1] : 73 yo f htn/hld/dm2, fam hx CRC (1/2 sister passed in 2020 at age 83) , GERD, h/o diverticulitis, osteoporosis, spondyloarthropathy on sulfasalazine (Humira apparently recommended but declined by patient), DJD presents for follow up of after EGD/Colonoscopy. \par \par EGD/Colonoscopy performed 8/31 for GERD/Screening/fam hx CRC- \par EGD - multiple gastric polyps -fundic gland polyps, esophagitis, no IM- recommend repeat EGD to complete removal of  multiple >1 cm gastric polyps \par Colonoscopy- TI showed stricture, bx with mild villuos blunting, no acute ileitis on bx, adenomatous and hyperplastic polyps, diverticulosis , recall in 3 years\par \par CTE 9/30/20- two short non contiguous areas of mild ileitis involving the distal and terminal ileum. Correlate for clinical and endoscopic evidence of Crohn's disease. \par \par She previously followed with Dr. Lala. \par \par She takes omeprazole 20 mg BID which has controlled her reflux symptoms well for years.\par If she misses 1 dose, she has recurrent reflux. \par She reports prior episodes of diverticulitis improved with outpatient abx. She had surgical eval with Dr. Hall, but no surgery planned, patient apparently has diverticulosis diffusely. \par \par She has daily regular BM with benefiber. \par \par Patient denies abdominal pain , n/v/ dysphagia/odynophagia, change in bowel habits, diarrhea,  brbpr, melena. no weight loss.  Good appetite and energy level. Patient has daily BM, denies regular NSAID use. \par \par \par  \par record review:\par EGD 3/23/16-perforemd for reflux h/o GIM-4mm patch of gastric mucosa at 37 cm, 1 cm above EGJ. thick mucosa at 38 cm, no nodular, soft on biopsy, 2 cm HH, red streaks in gastric antrum, duodenum not examined. \par -antral mucosa with mild chronic gastritis, negative for H/ pylori. \par -EGJ at 38 cm-mild nonspecific reactive changes, no glandular epithelium, no EoE/dysplasia/malignancy. \par -esophagus ay 37 cm- focal acute esophagitis with invading fungal organisms c/w candida, chronic inflammation of cardiac type gastric mucosa, negative for BE, negative for dysplasia, separate fragment of squamous mucosa with minimal reflux changes. \par \par \par EGD 3/19/2015-for reflux\par 2 cm HH\par path- duodenum- normal\par gastric-strips of antral/body without inflammation.\par GEJ- fragment of squamocolumnar mucosa with extensive incomplete IM, may represent BE.\par fundic mucosa with mild chronic nonactive gastritis without IM. , uninflamed squamous mucosa\par \par Colonoscopy 12/17/2015-multiple large diverticula, distorted lumen, retained scybala-prep was excellent except sigmoid colon. recall 7 years. \par \par \par she believes she had polyps on her first colonoscopy. \par \par \par \par fam hx- crc-1/2 sister

## 2020-10-18 NOTE — PHYSICAL EXAM
[General Appearance - Alert] : alert [Sclera] : the sclera and conjunctiva were normal [Outer Ear] : the ears and nose were normal in appearance [General Appearance - In No Acute Distress] : in no acute distress [Neck Appearance] : the appearance of the neck was normal [] : no respiratory distress [Abdomen Tenderness] : non-tender [Apical Impulse] : the apical impulse was normal [Abdomen Soft] : soft [Abnormal Walk] : normal gait [Cranial Nerves] : cranial nerves 2-12 were intact [Skin Color & Pigmentation] : normal skin color and pigmentation [Affect] : the affect was normal [Oriented To Time, Place, And Person] : oriented to person, place, and time [Impaired Insight] : insight and judgment were intact [Mood] : the mood was normal [FreeTextEntry1] : deferred to upcoming colonoscopy

## 2020-10-26 ENCOUNTER — APPOINTMENT (OUTPATIENT)
Dept: RHEUMATOLOGY | Facility: CLINIC | Age: 75
End: 2020-10-26
Payer: MEDICARE

## 2020-10-26 VITALS
BODY MASS INDEX: 35.36 KG/M2 | HEIGHT: 66 IN | SYSTOLIC BLOOD PRESSURE: 130 MMHG | OXYGEN SATURATION: 98 % | HEART RATE: 71 BPM | WEIGHT: 220 LBS | DIASTOLIC BLOOD PRESSURE: 70 MMHG

## 2020-10-26 PROCEDURE — 36415 COLL VENOUS BLD VENIPUNCTURE: CPT

## 2020-10-26 PROCEDURE — 99215 OFFICE O/P EST HI 40 MIN: CPT | Mod: 25

## 2020-10-27 LAB
25(OH)D3 SERPL-MCNC: 37.4 NG/ML
ALBUMIN SERPL ELPH-MCNC: 5 G/DL
ALP BLD-CCNC: 89 U/L
ALT SERPL-CCNC: 22 U/L
ANION GAP SERPL CALC-SCNC: 15 MMOL/L
AST SERPL-CCNC: 21 U/L
BASOPHILS # BLD AUTO: 0.05 K/UL
BASOPHILS NFR BLD AUTO: 0.8 %
BILIRUB SERPL-MCNC: 0.2 MG/DL
BUN SERPL-MCNC: 16 MG/DL
CALCIUM SERPL-MCNC: 9.8 MG/DL
CALCIUM SERPL-MCNC: 9.8 MG/DL
CHLORIDE SERPL-SCNC: 102 MMOL/L
CO2 SERPL-SCNC: 23 MMOL/L
CREAT SERPL-MCNC: 0.61 MG/DL
CRP SERPL-MCNC: 0.27 MG/DL
EOSINOPHIL # BLD AUTO: 0.17 K/UL
EOSINOPHIL NFR BLD AUTO: 2.8 %
ERYTHROCYTE [SEDIMENTATION RATE] IN BLOOD BY WESTERGREN METHOD: 11 MM/HR
GLUCOSE SERPL-MCNC: 94 MG/DL
HBV CORE IGG+IGM SER QL: NONREACTIVE
HBV SURFACE AB SER QL: NONREACTIVE
HBV SURFACE AG SER QL: NONREACTIVE
HCT VFR BLD CALC: 40.9 %
HCV AB SER QL: NONREACTIVE
HCV S/CO RATIO: 0.95 S/CO
HGB BLD-MCNC: 12.9 G/DL
IMM GRANULOCYTES NFR BLD AUTO: 0.3 %
LYMPHOCYTES # BLD AUTO: 1.91 K/UL
LYMPHOCYTES NFR BLD AUTO: 32 %
MAN DIFF?: NORMAL
MCHC RBC-ENTMCNC: 31.5 GM/DL
MCHC RBC-ENTMCNC: 31.5 PG
MCV RBC AUTO: 100 FL
MONOCYTES # BLD AUTO: 0.54 K/UL
MONOCYTES NFR BLD AUTO: 9 %
NEUTROPHILS # BLD AUTO: 3.28 K/UL
NEUTROPHILS NFR BLD AUTO: 55.1 %
PARATHYROID HORMONE INTACT: 34 PG/ML
PLATELET # BLD AUTO: 297 K/UL
POTASSIUM SERPL-SCNC: 4.9 MMOL/L
PROT SERPL-MCNC: 7.4 G/DL
RBC # BLD: 4.09 M/UL
RBC # FLD: 12.6 %
SODIUM SERPL-SCNC: 140 MMOL/L
URATE SERPL-MCNC: 4.8 MG/DL
VIT B12 SERPL-MCNC: 1195 PG/ML
WBC # FLD AUTO: 5.97 K/UL

## 2020-10-28 LAB
M TB IFN-G BLD-IMP: NEGATIVE
QUANTIFERON TB PLUS MITOGEN MINUS NIL: 1.47 IU/ML
QUANTIFERON TB PLUS NIL: 0.01 IU/ML
QUANTIFERON TB PLUS TB1 MINUS NIL: 0.01 IU/ML
QUANTIFERON TB PLUS TB2 MINUS NIL: 0 IU/ML

## 2020-10-29 LAB
COLLAGEN CTX SERPL-MCNC: 498 PG/ML
OSTEOCALCIN SERPL-MCNC: 24 NG/ML

## 2020-10-30 ENCOUNTER — RESULT REVIEW (OUTPATIENT)
Age: 75
End: 2020-10-30

## 2020-11-01 ENCOUNTER — RESULT REVIEW (OUTPATIENT)
Age: 75
End: 2020-11-01

## 2020-11-02 ENCOUNTER — NON-APPOINTMENT (OUTPATIENT)
Age: 75
End: 2020-11-02

## 2020-11-02 ENCOUNTER — APPOINTMENT (OUTPATIENT)
Dept: GASTROENTEROLOGY | Facility: HOSPITAL | Age: 75
End: 2020-11-02

## 2020-11-02 LAB — CALPROTECTIN FECAL: 79 UG/G

## 2020-11-02 NOTE — ADDENDUM
[FreeTextEntry1] : case discussed with Dr. Campbell at length.  Disease in terminal ileum classic for Crohn's.  Will obtain auth for HumBogota, as it has approval for seroneg spondylo, uveitis and Crohn's.  \par Stool calprotectin ordered.  Response to treatment will also be monitored by CT enterography in 6 months.

## 2020-11-02 NOTE — HISTORY OF PRESENT ILLNESS
[FreeTextEntry1] : Her sister  in may of colon cancer.  Her last colonoscopy was 7 years ago, and so she saw Dr. Campbell who did a colonoscopy/EGD, which showed multiple polyps.  Some were taken out and she will have another colonscopy on .  Narrowing at the terminal ileum was also seen.  CT abd/pelvis C+ was also done, and she was diagnosed with Crohns.  No abd pain, diarrhea/constipation, blood in stool.\par \par The day after she had a reaction to anesthesia (headache, chills, not feeling well).\par \par She is on SSz.  Her joints are improved.\par No new episodes of iritis.

## 2020-11-18 ENCOUNTER — RESULT CHARGE (OUTPATIENT)
Age: 75
End: 2020-11-18

## 2020-11-19 ENCOUNTER — APPOINTMENT (OUTPATIENT)
Dept: GERIATRICS | Facility: CLINIC | Age: 75
End: 2020-11-19
Payer: MEDICARE

## 2020-11-19 ENCOUNTER — NON-APPOINTMENT (OUTPATIENT)
Age: 75
End: 2020-11-19

## 2020-11-19 VITALS
DIASTOLIC BLOOD PRESSURE: 82 MMHG | TEMPERATURE: 98.3 F | HEART RATE: 89 BPM | SYSTOLIC BLOOD PRESSURE: 142 MMHG | OXYGEN SATURATION: 95 %

## 2020-11-19 DIAGNOSIS — K56.699 OTHER INTESTINAL OBSTRUCTION UNSPECIFIED AS TO PARTIAL VERSUS COMPLETE OBSTRUCTION: ICD-10-CM

## 2020-11-19 PROCEDURE — 99214 OFFICE O/P EST MOD 30 MIN: CPT

## 2020-11-23 PROBLEM — K56.699 SMALL BOWEL STRICTURE: Status: ACTIVE | Noted: 2020-09-04

## 2020-11-23 NOTE — HISTORY OF PRESENT ILLNESS
[PMH Reviewed and Updated] : past medical history reviewed and updated [PSH Reviewed and Updated] : past surgical history reviewed and updated [Family History Reviewed and Updated] : family history reviewed and updated [Medication and Allergies Reconciled] : medication and allergies reconciled [0] : 0 [Over the Past 2 Weeks, Have You Felt Down, Depressed, or Hopeless?] : 1.) Over the past 2 weeks, have you felt down, depressed, or hopeless? No [Over the Past 2 Weeks, Have You Felt Little Interest or Pleasure Doing Things?] : 2.) Over the past 2 weeks, have you felt little interest or pleasure doing things? No [FreeTextEntry1] : most acute and significant issue for Enma at the present time is the recent diagnosis of Crohns disease in addition to fairly recent diagnosis of seronegtive spondyloarthropathy\par 2 areas of ileitis seen on CT and stricture noted during colonoscopy-pt denies any GI symptoms \par \par pt does report not feeling well post colonoscopy, she reports shaking chills and headache for few hours post procedure once arrived home\par \par pt continues on sulfasalazine for joints, she is feeling well, denies pain or stiffness\par \par no further episodes of iritis-she cont to follow with ophtho, cont on 1 drop flarex left eye daily\par \par it has been rec to her to start on Humira-she remains very concerned mostly regarding being immunocompromised \par \par mammo done 8/2020\par \par \par \par \par

## 2020-11-23 NOTE — PHYSICAL EXAM
[General Appearance - Alert] : alert [General Appearance - In No Acute Distress] : in no acute distress [General Appearance - Well-Appearing] : healthy appearing [Sclera] : the sclera and conjunctiva were normal [PERRL With Normal Accommodation] : pupils were equal in size, round, and reactive to light [Extraocular Movements] : extraocular movements were intact [Optic Disc Abnormality] : the optic disc were normal in size and color [Normal Oral Mucosa] : normal oral mucosa [Outer Ear] : the ears and nose were normal in appearance [Hearing Threshold Finger Rub Not [place]] : hearing was normal [Both Tympanic Membranes Were Examined] : both tympanic membranes were normal [Oropharynx] : The oropharynx was normal [Neck Appearance] : the appearance of the neck was normal [Neck Cervical Mass (___cm)] : no neck mass was observed [Respiration, Rhythm And Depth] : normal respiratory rhythm and effort [Auscultation Breath Sounds / Voice Sounds] : lungs were clear to auscultation bilaterally [Chest Palpation] : palpation of the chest revealed no abnormalities [Lungs Percussion] : the lungs were normal to percussion [Apical Impulse] : the apical impulse was normal [Heart Rate And Rhythm] : heart rate was normal and rhythm regular [Heart Sounds] : normal S1 and S2 [Heart Sounds Gallop] : no gallops [Murmurs] : no murmurs [Heart Sounds Pericardial Friction Rub] : no pericardial rub [Arterial Pulses Carotid] : carotid pulses were normal with no bruits [Arterial Pulses Femoral] : femoral pulses were normal without bruits [Full Pulse] : the pedal pulses are present [Edema] : there was no peripheral edema [Breast Appearance] : normal in appearance [Breast Palpation Mass] : no palpable masses [Breast Abnormal Lactation (Galactorrhea)] : no nipple discharge [Bowel Sounds] : normal bowel sounds [Abdomen Soft] : soft [Abdomen Mass (___ Cm)] : no abdominal mass palpated [Patient Refused] : rectal exam was refused by the patient [Cervical Lymph Nodes Enlarged Posterior Bilaterally] : posterior cervical [Supraclavicular Lymph Nodes Enlarged Bilaterally] : supraclavicular [Axillary Lymph Nodes Enlarged Bilaterally] : axillary [No CVA Tenderness] : no ~M costovertebral angle tenderness [No Spinal Tenderness] : no spinal tenderness [Abnormal Walk] : normal gait [Nail Clubbing] : no clubbing  or cyanosis of the fingernails [Involuntary Movements] : no involuntary movements were seen [Motor Tone] : muscle strength and tone were normal [] : no rash [Skin Lesions] : no skin lesions [Cranial Nerves] : cranial nerves 2-12 were intact [Deep Tendon Reflexes (DTR)] : deep tendon reflexes were 2+ and symmetric [Sensation] : the sensory exam was normal to light touch and pinprick [Motor Exam] : the motor exam was normal [No Focal Deficits] : no focal deficits [Impaired Insight] : insight and judgment were intact [Affect] : the affect was normal [Mood] : the mood was normal [Memory Recent] : recent memory was not impaired [FreeTextEntry1] : upset regarding recent diagnoses, mildly anxious, very reasonable

## 2020-11-23 NOTE — ASSESSMENT
[Depression] : depression [Weight loss counseling given] : Weight loss counseling given [Non - Smoker] : non-smoker [FreeTextEntry1] : discussed other health maintenance rec's such as BMD, immunizations, GYN,  pt not wanting to have these done at the present time

## 2020-11-23 NOTE — REVIEW OF SYSTEMS
[As Noted in HPI] : as noted in HPI [Negative] : Heme/Lymph [Red Eyes] : eyes not red [de-identified] : some stress with

## 2020-12-08 ENCOUNTER — NON-APPOINTMENT (OUTPATIENT)
Age: 75
End: 2020-12-08

## 2020-12-09 ENCOUNTER — RX RENEWAL (OUTPATIENT)
Age: 75
End: 2020-12-09

## 2020-12-11 ENCOUNTER — APPOINTMENT (OUTPATIENT)
Dept: RHEUMATOLOGY | Facility: CLINIC | Age: 75
End: 2020-12-11
Payer: MEDICARE

## 2020-12-11 VITALS
WEIGHT: 220 LBS | SYSTOLIC BLOOD PRESSURE: 128 MMHG | HEIGHT: 66 IN | BODY MASS INDEX: 35.36 KG/M2 | DIASTOLIC BLOOD PRESSURE: 74 MMHG

## 2020-12-11 PROCEDURE — 99212 OFFICE O/P EST SF 10 MIN: CPT | Mod: 25

## 2020-12-11 PROCEDURE — 96401 CHEMO ANTI-NEOPL SQ/IM: CPT

## 2020-12-11 RX ORDER — ADALIMUMAB 80MG/0.8ML
80 KIT SUBCUTANEOUS
Qty: 0 | Refills: 0 | Status: COMPLETED | OUTPATIENT
Start: 2020-12-11

## 2020-12-11 RX ADMIN — ADALIMUMAB 0 MG/0.8ML: KIT at 00:00

## 2020-12-14 NOTE — PROCEDURE
[FreeTextEntry1] : Subcut injection:\par \par Area cleaned with alcohol. Humira 80mg/0.8mLx2 injected subcut-B/L thighs.Pt tolerated procedure well.

## 2020-12-14 NOTE — HISTORY OF PRESENT ILLNESS
[FreeTextEntry1] : Here for teaching of self-injection of Humira.  No symptoms of active infection. \par She continues taking SSz 2 tabs BID.

## 2020-12-28 ENCOUNTER — NON-APPOINTMENT (OUTPATIENT)
Age: 75
End: 2020-12-28

## 2020-12-30 ENCOUNTER — APPOINTMENT (OUTPATIENT)
Dept: GERIATRICS | Facility: CLINIC | Age: 75
End: 2020-12-30
Payer: MEDICARE

## 2020-12-30 DIAGNOSIS — S22.41XA MULTIPLE FRACTURES OF RIBS, RIGHT SIDE, INITIAL ENCOUNTER FOR CLOSED FRACTURE: ICD-10-CM

## 2020-12-30 PROCEDURE — 99442: CPT | Mod: 95

## 2020-12-30 NOTE — HISTORY OF PRESENT ILLNESS
[Discharge Summary] : discharge summary [Radiology Findings] : radiology findings [Discharge Med List] : discharge medication list [Patient Contacted By: ____] : and contacted by [unfilled] [FreeTextEntry2] : 75 year old female with a history of HTN, HLD, iritis, osteoporosis, chronic gastritis, vitamin B12 and D deficiencies, Crohn's disease who presents via telephone for a post ED follow up.  \par \par On 12/25 patient had tripped in the living room, ended up crashing into window sill and falling onto her side.  She immediately had pain and presented to the ED.  Chest XR confirmed non-displaced fractures of the right 5, 6, & 7th ribs.  Patient given tylenol in the ED which did help calm the pain.  She was discharged with a Rx for Tramadol but did not take this with concerns about GI issues.  She was initially taking Tylenol for her pain which helped but yesterday pain worsened.  She was unable to sleep from pain.  Started taking Advil 1 tab q 6hrs which is working better than tylenol.  She denies any gastric irritation and is taking with food.  She also sustained a large ecchymosis on her right leg but it is improving.

## 2021-01-12 ENCOUNTER — APPOINTMENT (OUTPATIENT)
Dept: RHEUMATOLOGY | Facility: CLINIC | Age: 76
End: 2021-01-12

## 2021-02-02 ENCOUNTER — APPOINTMENT (OUTPATIENT)
Dept: RHEUMATOLOGY | Facility: CLINIC | Age: 76
End: 2021-02-02
Payer: MEDICARE

## 2021-02-02 PROCEDURE — 99214 OFFICE O/P EST MOD 30 MIN: CPT | Mod: 95

## 2021-02-02 NOTE — HISTORY OF PRESENT ILLNESS
[Home] : at home, [unfilled] , at the time of the visit. [Other Location: e.g. Home (Enter Location, City,State)___] : at [unfilled] [Verbal consent obtained from patient] : the patient, [unfilled] [FreeTextEntry1] : She is doing Humira shots without difficulty.  She notes that the swelling in her hands went down.  Minimal stiffness when she wakes up in the morning.  She used to not be able to bend her hands in the morning, but now she can.  She no longer has the aches and pains in her knees.\par She fractured 3 ribs on [place] anh and is healing.  A few days ago she slipped on the wet floor and fell on the other side.  She is finally able to put a bra on.  No difficulty breathing.  No cough.\par No new episodes of inflammation in her eye.  She still uses one drop of steroids.  She will see Dr. Kay later this month.\par No GI symptoms.\par No new rashes or skin psoriasis.\par

## 2021-02-10 ENCOUNTER — LABORATORY RESULT (OUTPATIENT)
Age: 76
End: 2021-02-10

## 2021-03-16 ENCOUNTER — APPOINTMENT (OUTPATIENT)
Dept: GASTROENTEROLOGY | Facility: CLINIC | Age: 76
End: 2021-03-16
Payer: MEDICARE

## 2021-03-16 VITALS
OXYGEN SATURATION: 98 % | BODY MASS INDEX: 35.36 KG/M2 | HEIGHT: 66 IN | DIASTOLIC BLOOD PRESSURE: 74 MMHG | HEART RATE: 78 BPM | WEIGHT: 220 LBS | SYSTOLIC BLOOD PRESSURE: 130 MMHG | TEMPERATURE: 97.1 F

## 2021-03-16 PROCEDURE — 99213 OFFICE O/P EST LOW 20 MIN: CPT

## 2021-03-16 NOTE — ASSESSMENT
[FreeTextEntry1] : Small bowel stricture/Crohns disease- endoscopic and imaging findings consistent with Crohns disease. Patient currently asymptomatic, but discussed possibility of developing obstructive symptoms requiring surgery. \par She started Humira in 12/2020, managed by rheumatologist, \par -will plan for repeat CT Enterography in 3 months. \par -\par Gastric polyps- multiple polyps >1 cm seen on recent EGD . path consistent with fundic gland polyps due to PPI. \par Repeat EGD in 11/2020\par \par GERD- continue PPI\par \par Colon cancer screening, fam hx CRC- repeat colonoscopy 08/2023 \par \par

## 2021-03-16 NOTE — PHYSICAL EXAM
[General Appearance - Alert] : alert [General Appearance - In No Acute Distress] : in no acute distress [Sclera] : the sclera and conjunctiva were normal [Outer Ear] : the ears and nose were normal in appearance [Neck Appearance] : the appearance of the neck was normal [] : no respiratory distress [Apical Impulse] : the apical impulse was normal [Abdomen Soft] : soft [Abdomen Tenderness] : non-tender [Abnormal Walk] : normal gait [Skin Color & Pigmentation] : normal skin color and pigmentation [Cranial Nerves] : cranial nerves 2-12 were intact [Oriented To Time, Place, And Person] : oriented to person, place, and time [Impaired Insight] : insight and judgment were intact [Affect] : the affect was normal [Mood] : the mood was normal [FreeTextEntry1] : deferred to upcoming colonoscopy

## 2021-03-16 NOTE — HISTORY OF PRESENT ILLNESS
[FreeTextEntry1] : 74 yo f htn/hld/dm2, fam hx CRC (1/2 sister passed in 2020 at age 83) , GERD, h/o diverticulitis, osteoporosis, spondyloarthropathy on sulfasalazine. sb stricture/Crohns now on Humira since Dec 2020 presents for follow up. \par \par She had fall in Dec 2020 with 3 rib fx and leg injury. Now finally feeling better. \par still very active. \par no GI symptoms. \par tolerating Humira without difficulty \par reflux well controlled on PPI\par \par EGD/Colonoscopy performed 8/31 for GERD/Screening/fam hx CRC- \par EGD - multiple gastric polyps -fundic gland polyps, esophagitis, no IM- recommend repeat EGD to complete removal of  multiple >1 cm gastric polyps \par Colonoscopy- TI showed stricture, bx with mild villuos blunting, no acute ileitis on bx, adenomatous and hyperplastic polyps, diverticulosis , recall in 3 years\par \par \par CTE 9/30/20- two short non contiguous areas of mild ileitis involving the distal and terminal ileum. Correlate for clinical and endoscopic evidence of Crohn's disease. \par \par Repeat EGD 11/2020 for h/o gastric polyp, showed gastric polyp, path- fundic gland polyps. \par \par She previously followed with Dr. Lala. \par \par She reports prior episodes of diverticulitis improved with outpatient abx. She had surgical eval with Dr. Hall, but no surgery planned, patient apparently has diverticulosis diffusely. \par \par She has daily regular BM with benefiber. \par \par Patient denies abdominal pain , n/v/ dysphagia/odynophagia, change in bowel habits, diarrhea,  brbpr, melena. no weight loss.  Good appetite and energy level. Patient has daily BM, denies regular NSAID use. \par \par \par  \par record review:\par EGD 3/23/16-[facility] for reflux h/o GIM-4mm patch of gastric mucosa at 37 cm, 1 cm above EGJ. thick mucosa at 38 cm, no nodular, soft on biopsy, 2 cm HH, red streaks in gastric antrum, duodenum not examined. \par -antral mucosa with mild chronic gastritis, negative for H/ pylori. \par -EGJ at 38 cm-mild nonspecific reactive changes, no glandular epithelium, no EoE/dysplasia/malignancy. \par -esophagus ay 37 cm- focal acute esophagitis with invading fungal organisms c/w candida, chronic inflammation of cardiac type gastric mucosa, negative for BE, negative for dysplasia, separate fragment of squamous mucosa with minimal reflux changes. \par \par \par EGD 3/19/2015-for reflux\par 2 cm HH\par path- duodenum- normal\par gastric-strips of antral/body without inflammation.\par GEJ- fragment of squamocolumnar mucosa with extensive incomplete IM, may represent BE.\par fundic mucosa with mild chronic nonactive gastritis without IM. , uninflamed squamous mucosa\par \par Colonoscopy 12/17/2015-multiple large diverticula, distorted lumen, retained scybala-prep was excellent except sigmoid colon. recall 7 years. \par \par \par she believes she had polyps on her first colonoscopy. \par \par \par \par fam hx- crc-1/2 sister

## 2021-03-18 ENCOUNTER — NON-APPOINTMENT (OUTPATIENT)
Age: 76
End: 2021-03-18

## 2021-04-05 ENCOUNTER — APPOINTMENT (OUTPATIENT)
Dept: RHEUMATOLOGY | Facility: CLINIC | Age: 76
End: 2021-04-05
Payer: MEDICARE

## 2021-04-05 VITALS
HEIGHT: 66 IN | SYSTOLIC BLOOD PRESSURE: 130 MMHG | WEIGHT: 220 LBS | BODY MASS INDEX: 35.36 KG/M2 | DIASTOLIC BLOOD PRESSURE: 78 MMHG

## 2021-04-05 PROCEDURE — 36415 COLL VENOUS BLD VENIPUNCTURE: CPT

## 2021-04-05 PROCEDURE — 99214 OFFICE O/P EST MOD 30 MIN: CPT | Mod: 25

## 2021-04-06 NOTE — ASSESSMENT
[FreeTextEntry1] : We discussed that the COVID19 vaccine has been shown to be safe and effective.  It is highly recommended that she receive the vaccine to prevent gil COVID19.  Although there is presently insufficient data in autoimmune disease patients, there are no theoretical risks, and the benefit of preventing COVID infection outweighs the risk of mild short term post vaccination side effects or rare vaccine complications.\par Patient refuses vaccine at this time.

## 2021-04-06 NOTE — HISTORY OF PRESENT ILLNESS
[FreeTextEntry1] : She started Humira.  No issues with self-injecting.\par She saw ophthalmologist Dr. Kay and there was no evidence of disease activity in her eyes, but he wants her to continue using 1 drop in her eye until the next visit in 6 months.\par She got a canker sore last week and had a sore throat for 3 days.  \par She used to wake up with a sore back but since starting Humira this has not happened.  She also notes decreased joint swelling since starting the Humira.\par No GI symptoms.  She saw Dr. Herzog, who will repeat CT in 3 months.

## 2021-04-22 LAB
25(OH)D3 SERPL-MCNC: 49 NG/ML
ALBUMIN SERPL ELPH-MCNC: 4.9 G/DL
ALP BLD-CCNC: 105 U/L
ALT SERPL-CCNC: 21 U/L
ANION GAP SERPL CALC-SCNC: 13 MMOL/L
AST SERPL-CCNC: 18 U/L
BASOPHILS # BLD AUTO: 0.04 K/UL
BASOPHILS NFR BLD AUTO: 0.5 %
BILIRUB SERPL-MCNC: 0.2 MG/DL
BUN SERPL-MCNC: 14 MG/DL
CALCIUM SERPL-MCNC: 9.7 MG/DL
CHLORIDE SERPL-SCNC: 105 MMOL/L
CO2 SERPL-SCNC: 24 MMOL/L
CREAT SERPL-MCNC: 0.73 MG/DL
CRP SERPL-MCNC: 3 MG/L
EOSINOPHIL # BLD AUTO: 0.22 K/UL
EOSINOPHIL NFR BLD AUTO: 3 %
ERYTHROCYTE [SEDIMENTATION RATE] IN BLOOD BY WESTERGREN METHOD: 3 MM/HR
GLUCOSE SERPL-MCNC: 182 MG/DL
HCT VFR BLD CALC: 38.6 %
HGB BLD-MCNC: 12.5 G/DL
IMM GRANULOCYTES NFR BLD AUTO: 0.1 %
LYMPHOCYTES # BLD AUTO: 2.34 K/UL
LYMPHOCYTES NFR BLD AUTO: 31.8 %
MAN DIFF?: NORMAL
MCHC RBC-ENTMCNC: 32.1 PG
MCHC RBC-ENTMCNC: 32.4 GM/DL
MCV RBC AUTO: 99 FL
MONOCYTES # BLD AUTO: 0.88 K/UL
MONOCYTES NFR BLD AUTO: 12 %
NEUTROPHILS # BLD AUTO: 3.86 K/UL
NEUTROPHILS NFR BLD AUTO: 52.6 %
PLATELET # BLD AUTO: 292 K/UL
POTASSIUM SERPL-SCNC: 4 MMOL/L
PROT SERPL-MCNC: 7.2 G/DL
RBC # BLD: 3.9 M/UL
RBC # FLD: 12.8 %
SODIUM SERPL-SCNC: 141 MMOL/L
VIT B12 SERPL-MCNC: 1217 PG/ML
WBC # FLD AUTO: 7.35 K/UL

## 2021-06-14 ENCOUNTER — NON-APPOINTMENT (OUTPATIENT)
Age: 76
End: 2021-06-14

## 2021-06-14 ENCOUNTER — APPOINTMENT (OUTPATIENT)
Dept: GERIATRICS | Facility: CLINIC | Age: 76
End: 2021-06-14
Payer: MEDICARE

## 2021-06-14 VITALS
HEART RATE: 83 BPM | OXYGEN SATURATION: 96 % | DIASTOLIC BLOOD PRESSURE: 80 MMHG | TEMPERATURE: 98 F | SYSTOLIC BLOOD PRESSURE: 150 MMHG

## 2021-06-14 PROCEDURE — 93000 ELECTROCARDIOGRAM COMPLETE: CPT

## 2021-06-14 PROCEDURE — 99213 OFFICE O/P EST LOW 20 MIN: CPT | Mod: 25

## 2021-06-20 NOTE — HISTORY OF PRESENT ILLNESS
[Fully functional (bathing, dressing, toileting, transferring, walking, feeding)] : Fully functional (bathing, dressing, toileting, transferring, walking, feeding) [Fully functional (using the telephone, shopping, preparing meals, housekeeping, doing laundry, using transportation,] : Fully functional and needs no help or supervision to perform IADLs (using the telephone, shopping, preparing meals, housekeeping, doing laundry, using transportation, managing medications and managing finances) [Smoke Detector] : smoke detector [Carbon Monoxide Detector] : carbon monoxide detector [0] : 2) Feeling down, depressed, or hopeless: Not at all [PHQ-2 Score ___] : PHQ-2 Score [unfilled] [FreeTextEntry1] : pt here today with specific complaints:\par \par main issue today is-c/o elia LE edema, has been going on for couple of months, worse in past weeks, denies chest pain, Sob, wheeze,  decreased exercise tolerance, diet has not changed, on feet all day, elevating later in day\par \par \par joint pain, aching joints throughout, no specific joints\par pt did start on humira in december\par \par tired\par \par pt has also noted weight gain recently

## 2021-06-20 NOTE — PHYSICAL EXAM
[General Appearance - Alert] : alert [General Appearance - In No Acute Distress] : in no acute distress [General Appearance - Well-Appearing] : healthy appearing [Sclera] : the sclera and conjunctiva were normal [PERRL With Normal Accommodation] : pupils were equal in size, round, and reactive to light [Extraocular Movements] : extraocular movements were intact [Optic Disc Abnormality] : the optic disc were normal in size and color [Neck Appearance] : the appearance of the neck was normal [Neck Cervical Mass (___cm)] : no neck mass was observed [Jugular Venous Distention Increased] : there was no jugular-venous distention [Respiration, Rhythm And Depth] : normal respiratory rhythm and effort [Chest Palpation] : palpation of the chest revealed no abnormalities [Auscultation Breath Sounds / Voice Sounds] : lungs were clear to auscultation bilaterally [Lungs Percussion] : the lungs were normal to percussion [Apical Impulse] : the apical impulse was normal [Heart Rate And Rhythm] : heart rate was normal and rhythm regular [Heart Sounds] : normal S1 and S2 [Heart Sounds Gallop] : no gallops [Murmurs] : no murmurs [Heart Sounds Pericardial Friction Rub] : no pericardial rub [Arterial Pulses Carotid] : carotid pulses were normal with no bruits [Arterial Pulses Femoral] : femoral pulses were normal without bruits [Full Pulse] : the pedal pulses are present [Breast Appearance] : normal in appearance [Breast Palpation Mass] : no palpable masses [Breast Abnormal Lactation (Galactorrhea)] : no nipple discharge [Bowel Sounds] : normal bowel sounds [Abdomen Soft] : soft [Abdomen Mass (___ Cm)] : no abdominal mass palpated [Patient Refused] : rectal exam was refused by the patient [Cervical Lymph Nodes Enlarged Posterior Bilaterally] : posterior cervical [Supraclavicular Lymph Nodes Enlarged Bilaterally] : supraclavicular [Axillary Lymph Nodes Enlarged Bilaterally] : axillary [No CVA Tenderness] : no ~M costovertebral angle tenderness [No Spinal Tenderness] : no spinal tenderness [Abnormal Walk] : normal gait [Nail Clubbing] : no clubbing  or cyanosis of the fingernails [Involuntary Movements] : no involuntary movements were seen [Motor Tone] : muscle strength and tone were normal [] : no rash [Skin Lesions] : no skin lesions [Cranial Nerves] : cranial nerves 2-12 were intact [Sensation] : the sensory exam was normal to light touch and pinprick [Deep Tendon Reflexes (DTR)] : deep tendon reflexes were 2+ and symmetric [Motor Exam] : the motor exam was normal [No Focal Deficits] : no focal deficits [Impaired Insight] : insight and judgment were intact [Affect] : the affect was normal [Mood] : the mood was normal [Memory Recent] : recent memory was not impaired [Normal Oral Mucosa] : normal oral mucosa [Outer Ear] : the ears and nose were normal in appearance [Both Tympanic Membranes Were Examined] : both tympanic membranes were normal [Hearing Threshold Finger Rub Not [place]] : hearing was normal [Oropharynx] : The oropharynx was normal [FreeTextEntry1] : erythema nasal mucosa left >right

## 2021-06-20 NOTE — REVIEW OF SYSTEMS
[Recent Weight Gain (___ Lbs)] : recent [unfilled] ~Ulb weight gain [Lower Ext Edema] : lower extremity edema [As Noted in HPI] : as noted in HPI [Negative] : Heme/Lymph [Red Eyes] : eyes not red [de-identified] : some stress with

## 2021-06-29 ENCOUNTER — APPOINTMENT (OUTPATIENT)
Dept: RHEUMATOLOGY | Facility: CLINIC | Age: 76
End: 2021-06-29
Payer: MEDICARE

## 2021-06-29 ENCOUNTER — RESULT REVIEW (OUTPATIENT)
Age: 76
End: 2021-06-29

## 2021-06-29 VITALS
DIASTOLIC BLOOD PRESSURE: 70 MMHG | HEIGHT: 66 IN | BODY MASS INDEX: 35.36 KG/M2 | WEIGHT: 220 LBS | TEMPERATURE: 97.6 F | SYSTOLIC BLOOD PRESSURE: 148 MMHG

## 2021-06-29 DIAGNOSIS — R60.0 LOCALIZED EDEMA: ICD-10-CM

## 2021-06-29 PROCEDURE — 36415 COLL VENOUS BLD VENIPUNCTURE: CPT

## 2021-06-29 PROCEDURE — 99214 OFFICE O/P EST MOD 30 MIN: CPT | Mod: 25

## 2021-07-05 NOTE — HISTORY OF PRESENT ILLNESS
[FreeTextEntry1] : She continues taking Humira. \par \par She has swelling in her ankles.  She saw Dr. Reese, and will have an echo tomorrow.  \par She also gets tingling in her right arm.\par She has been pushing wheel barrels full of mulch recently, which she thinks started all of these symptoms.\par \par She is very opposed to COVID vaccine.\par \par She gets leg cramps at night.  She drinks diet tonic water.

## 2021-08-10 ENCOUNTER — RESULT REVIEW (OUTPATIENT)
Age: 76
End: 2021-08-10

## 2021-08-15 LAB
25(OH)D3 SERPL-MCNC: 42.9 NG/ML
ALBUMIN SERPL ELPH-MCNC: 4.7 G/DL
ALP BLD-CCNC: 108 U/L
ALT SERPL-CCNC: 27 U/L
ANION GAP SERPL CALC-SCNC: 14 MMOL/L
AST SERPL-CCNC: 22 U/L
BASOPHILS # BLD AUTO: 0.04 K/UL
BASOPHILS NFR BLD AUTO: 0.6 %
BILIRUB SERPL-MCNC: 0.2 MG/DL
BUN SERPL-MCNC: 16 MG/DL
CALCIUM SERPL-MCNC: 10 MG/DL
CHLORIDE SERPL-SCNC: 103 MMOL/L
CO2 SERPL-SCNC: 24 MMOL/L
CREAT SERPL-MCNC: 0.78 MG/DL
CRP SERPL-MCNC: <3 MG/L
EOSINOPHIL # BLD AUTO: 0.1 K/UL
EOSINOPHIL NFR BLD AUTO: 1.4 %
ERYTHROCYTE [SEDIMENTATION RATE] IN BLOOD BY WESTERGREN METHOD: 2 MM/HR
GLUCOSE SERPL-MCNC: 188 MG/DL
HCT VFR BLD CALC: 40.1 %
HGB BLD-MCNC: 12.3 G/DL
IMM GRANULOCYTES NFR BLD AUTO: 0.1 %
LYMPHOCYTES # BLD AUTO: 1.95 K/UL
LYMPHOCYTES NFR BLD AUTO: 28.2 %
MAN DIFF?: NORMAL
MCHC RBC-ENTMCNC: 30.7 GM/DL
MCHC RBC-ENTMCNC: 31.8 PG
MCV RBC AUTO: 103.6 FL
MONOCYTES # BLD AUTO: 0.66 K/UL
MONOCYTES NFR BLD AUTO: 9.5 %
NEUTROPHILS # BLD AUTO: 4.16 K/UL
NEUTROPHILS NFR BLD AUTO: 60.2 %
NT-PROBNP SERPL-MCNC: 77 PG/ML
PLATELET # BLD AUTO: 275 K/UL
POTASSIUM SERPL-SCNC: 4.5 MMOL/L
PROT SERPL-MCNC: 7 G/DL
RBC # BLD: 3.87 M/UL
RBC # FLD: 13.1 %
SODIUM SERPL-SCNC: 140 MMOL/L
WBC # FLD AUTO: 6.92 K/UL

## 2021-08-19 ENCOUNTER — NON-APPOINTMENT (OUTPATIENT)
Age: 76
End: 2021-08-19

## 2021-08-30 ENCOUNTER — RESULT REVIEW (OUTPATIENT)
Age: 76
End: 2021-08-30

## 2021-09-20 ENCOUNTER — APPOINTMENT (OUTPATIENT)
Dept: RHEUMATOLOGY | Facility: CLINIC | Age: 76
End: 2021-09-20
Payer: MEDICARE

## 2021-09-20 VITALS
WEIGHT: 220 LBS | BODY MASS INDEX: 35.36 KG/M2 | SYSTOLIC BLOOD PRESSURE: 156 MMHG | TEMPERATURE: 97.6 F | HEIGHT: 66 IN | DIASTOLIC BLOOD PRESSURE: 80 MMHG

## 2021-09-20 PROCEDURE — 36415 COLL VENOUS BLD VENIPUNCTURE: CPT

## 2021-09-20 PROCEDURE — 99214 OFFICE O/P EST MOD 30 MIN: CPT | Mod: 25

## 2021-09-22 LAB
ALBUMIN SERPL ELPH-MCNC: 4.8 G/DL
ALP BLD-CCNC: 113 U/L
ALT SERPL-CCNC: 58 U/L
ANION GAP SERPL CALC-SCNC: 15 MMOL/L
AST SERPL-CCNC: 34 U/L
BASOPHILS # BLD AUTO: 0.05 K/UL
BASOPHILS NFR BLD AUTO: 0.8 %
BILIRUB SERPL-MCNC: 0.2 MG/DL
BUN SERPL-MCNC: 13 MG/DL
CALCIUM SERPL-MCNC: 9.7 MG/DL
CHLORIDE SERPL-SCNC: 102 MMOL/L
CO2 SERPL-SCNC: 23 MMOL/L
CREAT SERPL-MCNC: 0.57 MG/DL
CRP SERPL-MCNC: <3 MG/L
EOSINOPHIL # BLD AUTO: 0.17 K/UL
EOSINOPHIL NFR BLD AUTO: 2.8 %
ERYTHROCYTE [SEDIMENTATION RATE] IN BLOOD BY WESTERGREN METHOD: 7 MM/HR
GLUCOSE SERPL-MCNC: 124 MG/DL
HCT VFR BLD CALC: 40 %
HGB BLD-MCNC: 12.5 G/DL
IMM GRANULOCYTES NFR BLD AUTO: 0.3 %
LYMPHOCYTES # BLD AUTO: 2.43 K/UL
LYMPHOCYTES NFR BLD AUTO: 39.3 %
MAN DIFF?: NORMAL
MCHC RBC-ENTMCNC: 31.3 GM/DL
MCHC RBC-ENTMCNC: 32.1 PG
MCV RBC AUTO: 102.8 FL
MONOCYTES # BLD AUTO: 0.61 K/UL
MONOCYTES NFR BLD AUTO: 9.9 %
NEUTROPHILS # BLD AUTO: 2.9 K/UL
NEUTROPHILS NFR BLD AUTO: 46.9 %
PLATELET # BLD AUTO: 279 K/UL
POTASSIUM SERPL-SCNC: 4.5 MMOL/L
PROT SERPL-MCNC: 7.3 G/DL
RBC # BLD: 3.89 M/UL
RBC # FLD: 13 %
SODIUM SERPL-SCNC: 140 MMOL/L
WBC # FLD AUTO: 6.18 K/UL

## 2021-09-23 NOTE — HISTORY OF PRESENT ILLNESS
[FreeTextEntry1] : She had her CT enterography, which was unchanged compared to prior except no active inflammation. \par She will have a colonoscopy every 3 years.\par No GI symptoms.\par \par She has been very active working outside and lifting heavy bags of mulch, then broke her ribs.\par She can finally get back outside.\par \par No peripheral joint pain.\par Minimal stiffness.\par \par She notes increased prominence of her base of elbows.\par \par Her skin is very thin and just by brushing against something she breaks the skin.\par \par She saw Dr. Kay a few weeks ago and there was no evidence of inflammation in her eyes.  She will apply steroid drop once a day.

## 2021-09-29 ENCOUNTER — RX RENEWAL (OUTPATIENT)
Age: 76
End: 2021-09-29

## 2021-11-15 ENCOUNTER — RX RENEWAL (OUTPATIENT)
Age: 76
End: 2021-11-15

## 2021-12-13 ENCOUNTER — APPOINTMENT (OUTPATIENT)
Dept: RHEUMATOLOGY | Facility: CLINIC | Age: 76
End: 2021-12-13
Payer: MEDICARE

## 2021-12-13 VITALS
HEIGHT: 66 IN | BODY MASS INDEX: 35.36 KG/M2 | SYSTOLIC BLOOD PRESSURE: 128 MMHG | DIASTOLIC BLOOD PRESSURE: 80 MMHG | WEIGHT: 220 LBS

## 2021-12-13 PROCEDURE — 99213 OFFICE O/P EST LOW 20 MIN: CPT

## 2021-12-17 NOTE — HISTORY OF PRESENT ILLNESS
[FreeTextEntry1] : She continues taking Humira.\par No GI symptoms.\par \par She gets pain in her knees.  No other joint pain.\par No joint stiffness.\par No eye inflammation.

## 2022-01-06 ENCOUNTER — NON-APPOINTMENT (OUTPATIENT)
Age: 77
End: 2022-01-06

## 2022-01-06 LAB
25(OH)D3 SERPL-MCNC: 39.9 NG/ML
ALBUMIN SERPL ELPH-MCNC: 4.6 G/DL
ALP BLD-CCNC: 97 U/L
ALT SERPL-CCNC: 33 U/L
ANION GAP SERPL CALC-SCNC: 14 MMOL/L
AST SERPL-CCNC: 21 U/L
BASOPHILS # BLD AUTO: 0.03 K/UL
BASOPHILS NFR BLD AUTO: 0.6 %
BILIRUB SERPL-MCNC: 0.4 MG/DL
BUN SERPL-MCNC: 13 MG/DL
CALCIUM SERPL-MCNC: 9.7 MG/DL
CHLORIDE SERPL-SCNC: 106 MMOL/L
CHOLEST SERPL-MCNC: 164 MG/DL
CO2 SERPL-SCNC: 22 MMOL/L
CREAT SERPL-MCNC: 0.54 MG/DL
CRP SERPL-MCNC: <3 MG/L
EOSINOPHIL # BLD AUTO: 0.01 K/UL
EOSINOPHIL NFR BLD AUTO: 0.2 %
ESTIMATED AVERAGE GLUCOSE: 143 MG/DL
FOLATE SERPL-MCNC: >20 NG/ML
GLUCOSE SERPL-MCNC: 154 MG/DL
HBA1C MFR BLD HPLC: 6.6 %
HCT VFR BLD CALC: 38.9 %
HDLC SERPL-MCNC: 62 MG/DL
HGB BLD-MCNC: 12.1 G/DL
IMM GRANULOCYTES NFR BLD AUTO: 0.2 %
LDLC SERPL CALC-MCNC: 83 MG/DL
LYMPHOCYTES # BLD AUTO: 1.45 K/UL
LYMPHOCYTES NFR BLD AUTO: 31 %
MAGNESIUM SERPL-MCNC: 2 MG/DL
MAN DIFF?: NORMAL
MCHC RBC-ENTMCNC: 31.1 GM/DL
MCHC RBC-ENTMCNC: 31.3 PG
MCV RBC AUTO: 100.8 FL
MONOCYTES # BLD AUTO: 0.49 K/UL
MONOCYTES NFR BLD AUTO: 10.5 %
NEUTROPHILS # BLD AUTO: 2.69 K/UL
NEUTROPHILS NFR BLD AUTO: 57.5 %
NONHDLC SERPL-MCNC: 102 MG/DL
PLATELET # BLD AUTO: 266 K/UL
POTASSIUM SERPL-SCNC: 4.5 MMOL/L
PROT SERPL-MCNC: 7.2 G/DL
RBC # BLD: 3.86 M/UL
RBC # FLD: 12.7 %
SODIUM SERPL-SCNC: 141 MMOL/L
TRIGL SERPL-MCNC: 98 MG/DL
VIT B12 SERPL-MCNC: 1304 PG/ML
WBC # FLD AUTO: 4.68 K/UL

## 2022-03-07 ENCOUNTER — APPOINTMENT (OUTPATIENT)
Dept: GERIATRICS | Facility: CLINIC | Age: 77
End: 2022-03-07
Payer: MEDICARE

## 2022-03-07 VITALS
OXYGEN SATURATION: 99 % | TEMPERATURE: 98 F | HEART RATE: 65 BPM | DIASTOLIC BLOOD PRESSURE: 80 MMHG | SYSTOLIC BLOOD PRESSURE: 150 MMHG

## 2022-03-07 PROCEDURE — G0439: CPT

## 2022-03-12 NOTE — PHYSICAL EXAM
[General Appearance - Alert] : alert [General Appearance - In No Acute Distress] : in no acute distress [General Appearance - Well-Appearing] : healthy appearing [Sclera] : the sclera and conjunctiva were normal [PERRL With Normal Accommodation] : pupils were equal in size, round, and reactive to light [Extraocular Movements] : extraocular movements were intact [Optic Disc Abnormality] : the optic disc were normal in size and color [Normal Oral Mucosa] : normal oral mucosa [Outer Ear] : the ears and nose were normal in appearance [Hearing Threshold Finger Rub Not [place]] : hearing was normal [Both Tympanic Membranes Were Examined] : both tympanic membranes were normal [Oropharynx] : The oropharynx was normal [Neck Appearance] : the appearance of the neck was normal [Neck Cervical Mass (___cm)] : no neck mass was observed [Respiration, Rhythm And Depth] : normal respiratory rhythm and effort [Auscultation Breath Sounds / Voice Sounds] : lungs were clear to auscultation bilaterally [Chest Palpation] : palpation of the chest revealed no abnormalities No [Lungs Percussion] : the lungs were normal to percussion [Apical Impulse] : the apical impulse was normal [Heart Rate And Rhythm] : heart rate was normal and rhythm regular [Heart Sounds] : normal S1 and S2 [Heart Sounds Gallop] : no gallops [Murmurs] : no murmurs [Heart Sounds Pericardial Friction Rub] : no pericardial rub [Arterial Pulses Carotid] : carotid pulses were normal with no bruits [Arterial Pulses Femoral] : femoral pulses were normal without bruits [Full Pulse] : the pedal pulses are present [Edema] : there was no peripheral edema [Bowel Sounds] : normal bowel sounds [Abdomen Soft] : soft [Abdomen Mass (___ Cm)] : no abdominal mass palpated [Cervical Lymph Nodes Enlarged Posterior Bilaterally] : posterior cervical [Supraclavicular Lymph Nodes Enlarged Bilaterally] : supraclavicular [Axillary Lymph Nodes Enlarged Bilaterally] : axillary [No Spinal Tenderness] : no spinal tenderness [Abnormal Walk] : normal gait [Nail Clubbing] : no clubbing  or cyanosis of the fingernails [Involuntary Movements] : no involuntary movements were seen [Motor Tone] : muscle strength and tone were normal [] : no rash [Skin Lesions] : no skin lesions [Cranial Nerves] : cranial nerves 2-12 were intact [Deep Tendon Reflexes (DTR)] : deep tendon reflexes were 2+ and symmetric [Sensation] : the sensory exam was normal to light touch and pinprick [Motor Exam] : the motor exam was normal [No Focal Deficits] : no focal deficits [Impaired Insight] : insight and judgment were intact [Affect] : the affect was normal [Mood] : the mood was normal [Memory Recent] : recent memory was not impaired [FreeTextEntry1] : mild synovitis, PIP joints elia hands

## 2022-03-13 NOTE — HISTORY OF PRESENT ILLNESS
[No falls in past year] : Patient reported no falls in the past year [Completely Independent] : Completely independent. [PMH Reviewed and Updated] : past medical history reviewed and updated [Medication and Allergies Reconciled] : medication and allergies reconciled [Over the Past 2 Weeks, Have You Felt Down, Depressed, or Hopeless?] : 1.) Over the past 2 weeks, have you felt down, depressed, or hopeless? No [Over the Past 2 Weeks, Have You Felt Little Interest or Pleasure Doing Things?] : 2.) Over the past 2 weeks, have you felt little interest or pleasure doing things? No [de-identified] : some discomfort in knees [FreeTextEntry1] : doing well, no acute issues\par \par \par seen by dr liriano -last week, doing well, cont on 1 drop left eye, return 6 months\par \par seen by dr macias december \par \par colonoscopy aug 2020-due 8/23\par \par mammo-aug 2021\par \par due for mammo and BMD sept 2022\par \par rec  shingrix\par \par rec gyn\par \par  [BoneDensityDate] : 2/22 [Mammogramdate] : 8/31/20 [FreeTextEntry3] : 8/30/21 [FreeTextEntry7] : EGD-11/20

## 2022-03-13 NOTE — REVIEW OF SYSTEMS
[Recent Weight Gain (___ Lbs)] : recent [unfilled] ~Ulb weight gain [Lower Ext Edema] : lower extremity edema [As Noted in HPI] : as noted in HPI [Negative] : Heme/Lymph [Red Eyes] : eyes not red [de-identified] : some stress with

## 2022-03-13 NOTE — PHYSICAL EXAM
[General Appearance - Alert] : alert [General Appearance - In No Acute Distress] : in no acute distress [General Appearance - Well-Appearing] : healthy appearing [PERRL With Normal Accommodation] : pupils were equal in size, round, and reactive to light [Sclera] : the sclera and conjunctiva were normal [Extraocular Movements] : extraocular movements were intact [Optic Disc Abnormality] : the optic disc were normal in size and color [Neck Appearance] : the appearance of the neck was normal [Neck Cervical Mass (___cm)] : no neck mass was observed [Jugular Venous Distention Increased] : there was no jugular-venous distention [Respiration, Rhythm And Depth] : normal respiratory rhythm and effort [Auscultation Breath Sounds / Voice Sounds] : lungs were clear to auscultation bilaterally [Chest Palpation] : palpation of the chest revealed no abnormalities [Lungs Percussion] : the lungs were normal to percussion [Heart Rate And Rhythm] : heart rate was normal and rhythm regular [Apical Impulse] : the apical impulse was normal [Heart Sounds] : normal S1 and S2 [Heart Sounds Gallop] : no gallops [Murmurs] : no murmurs [Heart Sounds Pericardial Friction Rub] : no pericardial rub [Arterial Pulses Carotid] : carotid pulses were normal with no bruits [Arterial Pulses Femoral] : femoral pulses were normal without bruits [Full Pulse] : the pedal pulses are present [Breast Appearance] : normal in appearance [Breast Palpation Mass] : no palpable masses [Breast Abnormal Lactation (Galactorrhea)] : no nipple discharge [Bowel Sounds] : normal bowel sounds [Abdomen Soft] : soft [Abdomen Mass (___ Cm)] : no abdominal mass palpated [Patient Refused] : rectal exam was refused by the patient [Cervical Lymph Nodes Enlarged Posterior Bilaterally] : posterior cervical [Supraclavicular Lymph Nodes Enlarged Bilaterally] : supraclavicular [Axillary Lymph Nodes Enlarged Bilaterally] : axillary [No CVA Tenderness] : no ~M costovertebral angle tenderness [No Spinal Tenderness] : no spinal tenderness [Abnormal Walk] : normal gait [Nail Clubbing] : no clubbing  or cyanosis of the fingernails [Involuntary Movements] : no involuntary movements were seen [Motor Tone] : muscle strength and tone were normal [] : no rash [Skin Lesions] : no skin lesions [Cranial Nerves] : cranial nerves 2-12 were intact [Deep Tendon Reflexes (DTR)] : deep tendon reflexes were 2+ and symmetric [Sensation] : the sensory exam was normal to light touch and pinprick [Motor Exam] : the motor exam was normal [No Focal Deficits] : no focal deficits [Impaired Insight] : insight and judgment were intact [Affect] : the affect was normal [Mood] : the mood was normal [Memory Recent] : recent memory was not impaired [Normal Oral Mucosa] : normal oral mucosa [Outer Ear] : the ears and nose were normal in appearance [Hearing Threshold Finger Rub Not [place]] : hearing was normal [Both Tympanic Membranes Were Examined] : both tympanic membranes were normal [Oropharynx] : The oropharynx was normal [FreeTextEntry1] : erythema nasal mucosa left >right

## 2022-03-14 ENCOUNTER — APPOINTMENT (OUTPATIENT)
Dept: RHEUMATOLOGY | Facility: CLINIC | Age: 77
End: 2022-03-14

## 2022-04-07 ENCOUNTER — RX RENEWAL (OUTPATIENT)
Age: 77
End: 2022-04-07

## 2022-04-07 ENCOUNTER — APPOINTMENT (OUTPATIENT)
Dept: RHEUMATOLOGY | Facility: CLINIC | Age: 77
End: 2022-04-07
Payer: MEDICARE

## 2022-04-07 VITALS
BODY MASS INDEX: 35.36 KG/M2 | SYSTOLIC BLOOD PRESSURE: 132 MMHG | OXYGEN SATURATION: 95 % | HEIGHT: 66 IN | HEART RATE: 72 BPM | WEIGHT: 220 LBS | DIASTOLIC BLOOD PRESSURE: 74 MMHG

## 2022-04-07 PROCEDURE — 36415 COLL VENOUS BLD VENIPUNCTURE: CPT

## 2022-04-07 PROCEDURE — 99213 OFFICE O/P EST LOW 20 MIN: CPT | Mod: 25

## 2022-04-08 LAB
25(OH)D3 SERPL-MCNC: 49.8 NG/ML
BASOPHILS # BLD AUTO: 0.04 K/UL
BASOPHILS NFR BLD AUTO: 0.5 %
EOSINOPHIL # BLD AUTO: 0.17 K/UL
EOSINOPHIL NFR BLD AUTO: 2.3 %
ERYTHROCYTE [SEDIMENTATION RATE] IN BLOOD BY WESTERGREN METHOD: 9 MM/HR
ESTIMATED AVERAGE GLUCOSE: 140 MG/DL
HBA1C MFR BLD HPLC: 6.5 %
HCT VFR BLD CALC: 37.5 %
HGB BLD-MCNC: 12.2 G/DL
IMM GRANULOCYTES NFR BLD AUTO: 0.1 %
LYMPHOCYTES # BLD AUTO: 2.89 K/UL
LYMPHOCYTES NFR BLD AUTO: 39.4 %
MAN DIFF?: NORMAL
MCHC RBC-ENTMCNC: 32.5 GM/DL
MCHC RBC-ENTMCNC: 32.6 PG
MCV RBC AUTO: 100.3 FL
MONOCYTES # BLD AUTO: 0.7 K/UL
MONOCYTES NFR BLD AUTO: 9.5 %
NEUTROPHILS # BLD AUTO: 3.53 K/UL
NEUTROPHILS NFR BLD AUTO: 48.2 %
PLATELET # BLD AUTO: 274 K/UL
RBC # BLD: 3.74 M/UL
RBC # FLD: 12.8 %
WBC # FLD AUTO: 7.34 K/UL

## 2022-04-14 ENCOUNTER — TRANSCRIPTION ENCOUNTER (OUTPATIENT)
Age: 77
End: 2022-04-14

## 2022-04-28 NOTE — HISTORY OF PRESENT ILLNESS
[FreeTextEntry1] : She continues taking Humira.\par No GI symptoms.\par \par She gets pain in her knees but has known arthritis.  She also has arthritis in her left shoulder.  She sometimes gets\par No morning stiffness.\par \par \par She saw Dr. Kay in early March.  No eye inflammation was seen.  He did recommend she continue one drop of steroid drop in each eye to prevent future inflammation.

## 2022-07-06 ENCOUNTER — RX RENEWAL (OUTPATIENT)
Age: 77
End: 2022-07-06

## 2022-07-14 ENCOUNTER — APPOINTMENT (OUTPATIENT)
Dept: RHEUMATOLOGY | Facility: CLINIC | Age: 77
End: 2022-07-14

## 2022-07-14 VITALS
HEIGHT: 66 IN | SYSTOLIC BLOOD PRESSURE: 124 MMHG | HEART RATE: 70 BPM | BODY MASS INDEX: 35.36 KG/M2 | DIASTOLIC BLOOD PRESSURE: 64 MMHG | WEIGHT: 220 LBS | OXYGEN SATURATION: 94 %

## 2022-07-14 PROCEDURE — 36415 COLL VENOUS BLD VENIPUNCTURE: CPT

## 2022-07-14 PROCEDURE — 99213 OFFICE O/P EST LOW 20 MIN: CPT | Mod: 25

## 2022-08-27 NOTE — HISTORY OF PRESENT ILLNESS
[FreeTextEntry1] : She continues taking Humira, without issues.\par \par She is not achy anymore.  \par \par She has a large bunion on the right that burns/hurts at night.\par \par She will see Dr. Kay in September.  She is still using one drop a day.

## 2022-09-01 ENCOUNTER — RESULT REVIEW (OUTPATIENT)
Age: 77
End: 2022-09-01

## 2022-09-06 LAB
ALBUMIN SERPL ELPH-MCNC: 4.6 G/DL
ALBUMIN SERPL ELPH-MCNC: 4.9 G/DL
ALP BLD-CCNC: 83 U/L
ALP BLD-CCNC: 96 U/L
ALT SERPL-CCNC: 25 U/L
ALT SERPL-CCNC: 26 U/L
ANION GAP SERPL CALC-SCNC: 14 MMOL/L
ANION GAP SERPL CALC-SCNC: 15 MMOL/L
AST SERPL-CCNC: 21 U/L
AST SERPL-CCNC: 22 U/L
BASOPHILS # BLD AUTO: 0.05 K/UL
BASOPHILS NFR BLD AUTO: 0.8 %
BILIRUB SERPL-MCNC: 0.2 MG/DL
BILIRUB SERPL-MCNC: 0.3 MG/DL
BUN SERPL-MCNC: 13 MG/DL
BUN SERPL-MCNC: 14 MG/DL
CALCIUM SERPL-MCNC: 9.7 MG/DL
CALCIUM SERPL-MCNC: 9.8 MG/DL
CHLORIDE SERPL-SCNC: 102 MMOL/L
CHLORIDE SERPL-SCNC: 103 MMOL/L
CO2 SERPL-SCNC: 24 MMOL/L
CO2 SERPL-SCNC: 24 MMOL/L
CREAT SERPL-MCNC: 0.56 MG/DL
CREAT SERPL-MCNC: 0.62 MG/DL
CRP SERPL-MCNC: <3 MG/L
CRP SERPL-MCNC: <3 MG/L
EGFR: 92 ML/MIN/1.73M2
EGFR: 95 ML/MIN/1.73M2
EOSINOPHIL # BLD AUTO: 0.16 K/UL
EOSINOPHIL NFR BLD AUTO: 2.7 %
ERYTHROCYTE [SEDIMENTATION RATE] IN BLOOD BY WESTERGREN METHOD: 6 MM/HR
ESTIMATED AVERAGE GLUCOSE: 143 MG/DL
GLUCOSE SERPL-MCNC: 126 MG/DL
GLUCOSE SERPL-MCNC: 99 MG/DL
HBA1C MFR BLD HPLC: 6.6 %
HCT VFR BLD CALC: 38.3 %
HGB BLD-MCNC: 12.2 G/DL
IMM GRANULOCYTES NFR BLD AUTO: 0.2 %
LYMPHOCYTES # BLD AUTO: 2.42 K/UL
LYMPHOCYTES NFR BLD AUTO: 40.5 %
MAN DIFF?: NORMAL
MCHC RBC-ENTMCNC: 31.9 GM/DL
MCHC RBC-ENTMCNC: 32.3 PG
MCV RBC AUTO: 101.3 FL
MONOCYTES # BLD AUTO: 0.71 K/UL
MONOCYTES NFR BLD AUTO: 11.9 %
NEUTROPHILS # BLD AUTO: 2.63 K/UL
NEUTROPHILS NFR BLD AUTO: 43.9 %
PLATELET # BLD AUTO: 272 K/UL
POTASSIUM SERPL-SCNC: 4.3 MMOL/L
POTASSIUM SERPL-SCNC: 4.8 MMOL/L
PROT SERPL-MCNC: 7.3 G/DL
PROT SERPL-MCNC: 7.6 G/DL
RBC # BLD: 3.78 M/UL
RBC # FLD: 12.7 %
SODIUM SERPL-SCNC: 141 MMOL/L
SODIUM SERPL-SCNC: 142 MMOL/L
WBC # FLD AUTO: 5.98 K/UL

## 2022-09-24 ENCOUNTER — RESULT REVIEW (OUTPATIENT)
Age: 77
End: 2022-09-24

## 2022-09-26 ENCOUNTER — APPOINTMENT (OUTPATIENT)
Dept: GERIATRICS | Facility: CLINIC | Age: 77
End: 2022-09-26

## 2022-09-26 VITALS
HEART RATE: 99 BPM | DIASTOLIC BLOOD PRESSURE: 80 MMHG | SYSTOLIC BLOOD PRESSURE: 140 MMHG | TEMPERATURE: 98 F | OXYGEN SATURATION: 95 %

## 2022-09-26 DIAGNOSIS — Z82.49 FAMILY HISTORY OF ISCHEMIC HEART DISEASE AND OTHER DISEASES OF THE CIRCULATORY SYSTEM: ICD-10-CM

## 2022-09-26 DIAGNOSIS — Z23 ENCOUNTER FOR IMMUNIZATION: ICD-10-CM

## 2022-09-26 DIAGNOSIS — Z11.1 ENCOUNTER FOR SCREENING FOR RESPIRATORY TUBERCULOSIS: ICD-10-CM

## 2022-09-26 DIAGNOSIS — Z60.2 PROBLEMS RELATED TO LIVING ALONE: ICD-10-CM

## 2022-09-26 DIAGNOSIS — J45.991 COUGH VARIANT ASTHMA: ICD-10-CM

## 2022-09-26 DIAGNOSIS — Z86.39 PERSONAL HISTORY OF OTHER ENDOCRINE, NUTRITIONAL AND METABOLIC DISEASE: ICD-10-CM

## 2022-09-26 PROCEDURE — 99213 OFFICE O/P EST LOW 20 MIN: CPT

## 2022-09-26 SDOH — SOCIAL STABILITY - SOCIAL INSECURITY: PROBLEMS RELATED TO LIVING ALONE: Z60.2

## 2022-09-29 PROBLEM — Z11.1 SCREENING FOR TUBERCULOSIS: Status: ACTIVE | Noted: 2022-09-26

## 2022-10-06 ENCOUNTER — RX RENEWAL (OUTPATIENT)
Age: 77
End: 2022-10-06

## 2022-10-16 PROBLEM — Z23 NEED FOR IMMUNIZATION AGAINST INFLUENZA: Status: ACTIVE | Noted: 2022-09-26

## 2022-10-16 PROBLEM — J45.991 COUGH VARIANT ASTHMA: Status: ACTIVE | Noted: 2018-11-18

## 2022-10-16 NOTE — PHYSICAL EXAM
[General Appearance - Alert] : alert [General Appearance - In No Acute Distress] : in no acute distress [General Appearance - Well-Appearing] : healthy appearing [Sclera] : the sclera and conjunctiva were normal [PERRL With Normal Accommodation] : pupils were equal in size, round, and reactive to light [Extraocular Movements] : extraocular movements were intact [Optic Disc Abnormality] : the optic disc were normal in size and color [Neck Appearance] : the appearance of the neck was normal [Neck Cervical Mass (___cm)] : no neck mass was observed [Jugular Venous Distention Increased] : there was no jugular-venous distention [Respiration, Rhythm And Depth] : normal respiratory rhythm and effort [Auscultation Breath Sounds / Voice Sounds] : lungs were clear to auscultation bilaterally [Chest Palpation] : palpation of the chest revealed no abnormalities [Lungs Percussion] : the lungs were normal to percussion [Apical Impulse] : the apical impulse was normal [Heart Rate And Rhythm] : heart rate was normal and rhythm regular [Heart Sounds] : normal S1 and S2 [Heart Sounds Gallop] : no gallops [Murmurs] : no murmurs [Heart Sounds Pericardial Friction Rub] : no pericardial rub [Arterial Pulses Carotid] : carotid pulses were normal with no bruits [Arterial Pulses Femoral] : femoral pulses were normal without bruits [Full Pulse] : the pedal pulses are present [Breast Appearance] : normal in appearance [Breast Palpation Mass] : no palpable masses [Breast Abnormal Lactation (Galactorrhea)] : no nipple discharge [Bowel Sounds] : normal bowel sounds [Abdomen Soft] : soft [Abdomen Mass (___ Cm)] : no abdominal mass palpated [Patient Refused] : rectal exam was refused by the patient [Cervical Lymph Nodes Enlarged Posterior Bilaterally] : posterior cervical [Supraclavicular Lymph Nodes Enlarged Bilaterally] : supraclavicular [Axillary Lymph Nodes Enlarged Bilaterally] : axillary [No CVA Tenderness] : no ~M costovertebral angle tenderness [No Spinal Tenderness] : no spinal tenderness [Abnormal Walk] : normal gait [Nail Clubbing] : no clubbing  or cyanosis of the fingernails [Involuntary Movements] : no involuntary movements were seen [Motor Tone] : muscle strength and tone were normal [] : no rash [Skin Lesions] : no skin lesions [Cranial Nerves] : cranial nerves 2-12 were intact [Deep Tendon Reflexes (DTR)] : deep tendon reflexes were 2+ and symmetric [Sensation] : the sensory exam was normal to light touch and pinprick [Motor Exam] : the motor exam was normal [No Focal Deficits] : no focal deficits [Oriented To Time, Place, And Person] : oriented to person, place, and time [Impaired Insight] : insight and judgment were intact [Affect] : the affect was normal [Mood] : the mood was normal [Memory Recent] : recent memory was not impaired [Memory Remote] : remote memory was not impaired [Normal Oral Mucosa] : normal oral mucosa [Outer Ear] : the ears and nose were normal in appearance [Hearing Threshold Finger Rub Not [place]] : hearing was normal [Both Tympanic Membranes Were Examined] : both tympanic membranes were normal [Oropharynx] : The oropharynx was normal [Tactile Decrease Entire Leg Right] : normal right leg [Tactile Decrease Entire Leg Left] : normal left leg [Tactile Decrease Monofilament Wire Test Right Foot] : normal right foot monofilament wire testing [Tactile Decrease Monofilament Wire Test Left Foot] : normal left foot monofilament wire testing [FreeTextEntry1] : sensory intact

## 2022-10-16 NOTE — HISTORY OF PRESENT ILLNESS
[No falls in past year] : Patient reported no falls in the past year [Completely Independent] : Completely independent. [Smoke Detector] : smoke detector [Carbon Monoxide Detector] : carbon monoxide detector [Wears Seat Belt] : wears seat belt [0] : 2) Feeling down, depressed, or hopeless: Not at all (0) [FreeTextEntry1] : here for follow up on BS control\par labs drawn for review\par \par pt has been attempting to decrease sugars and carbs in her diet, she mostly has cut out all alcohol/wine. she has cutdown somewhat on n bread but only by a little eris is stating there is more room for improvement on diet [Driving Concerns] : not driving or driving without noted concerns [[id number]] : 0

## 2022-10-16 NOTE — REVIEW OF SYSTEMS
[As Noted in HPI] : as noted in HPI [Negative] : Heme/Lymph [Red Eyes] : eyes not red [Lower Ext Edema] : no lower extremity edema [de-identified] : some stress with

## 2022-10-24 ENCOUNTER — APPOINTMENT (OUTPATIENT)
Dept: RHEUMATOLOGY | Facility: CLINIC | Age: 77
End: 2022-10-24

## 2022-10-24 VITALS
OXYGEN SATURATION: 97 % | DIASTOLIC BLOOD PRESSURE: 82 MMHG | HEIGHT: 66 IN | SYSTOLIC BLOOD PRESSURE: 130 MMHG | WEIGHT: 220 LBS | HEART RATE: 75 BPM | BODY MASS INDEX: 35.36 KG/M2

## 2022-10-24 PROCEDURE — 99213 OFFICE O/P EST LOW 20 MIN: CPT | Mod: 25

## 2022-10-24 PROCEDURE — 36415 COLL VENOUS BLD VENIPUNCTURE: CPT

## 2022-10-25 ENCOUNTER — RX RENEWAL (OUTPATIENT)
Age: 77
End: 2022-10-25

## 2022-10-30 NOTE — HISTORY OF PRESENT ILLNESS
[FreeTextEntry1] : She continues taking Humira every other week.  No side effects.  She also takes SSz 2 tabs BID.\par \par She is stiff when inactive, but once she starts to move, she feels good.\par \par No inflammation of her eyes.\par \par No GI symptoms.

## 2022-12-03 ENCOUNTER — RESULT REVIEW (OUTPATIENT)
Age: 77
End: 2022-12-03

## 2022-12-05 ENCOUNTER — APPOINTMENT (OUTPATIENT)
Dept: GERIATRICS | Facility: CLINIC | Age: 77
End: 2022-12-05

## 2022-12-05 VITALS
SYSTOLIC BLOOD PRESSURE: 142 MMHG | TEMPERATURE: 97.6 F | HEART RATE: 76 BPM | OXYGEN SATURATION: 98 % | DIASTOLIC BLOOD PRESSURE: 70 MMHG

## 2022-12-05 DIAGNOSIS — R25.2 CRAMP AND SPASM: ICD-10-CM

## 2022-12-05 DIAGNOSIS — R73.09 OTHER ABNORMAL GLUCOSE: ICD-10-CM

## 2022-12-05 PROCEDURE — 99213 OFFICE O/P EST LOW 20 MIN: CPT

## 2022-12-13 ENCOUNTER — RESULT REVIEW (OUTPATIENT)
Age: 77
End: 2022-12-13

## 2022-12-26 PROBLEM — R73.09 ABNORMAL BLOOD SUGAR: Status: ACTIVE | Noted: 2018-11-18

## 2022-12-26 PROBLEM — R25.2 LEG CRAMPS: Status: ACTIVE | Noted: 2021-07-05

## 2022-12-26 NOTE — HISTORY OF PRESENT ILLNESS
[No falls in past year] : Patient reported no falls in the past year [Completely Independent] : Completely independent. [Smoke Detector] : smoke detector [Carbon Monoxide Detector] : carbon monoxide detector [0] : 2) Feeling down, depressed, or hopeless: Not at all (0) [FreeTextEntry1] : doing well, feeling well, \par here for follow up regarding elevated BS\par \par vision stable, follows with ophtho-continues on eye drops\par follows with dr macias for spondyloarthropathy-on Humira and sulfasalazine [[id number]] : 0

## 2022-12-26 NOTE — REVIEW OF SYSTEMS
[Joint Stiffness] : joint stiffness [Negative] : Heme/Lymph [Dysuria] : no dysuria [FreeTextEntry9] : when still for period of time, in am, overall improved

## 2022-12-28 ENCOUNTER — NON-APPOINTMENT (OUTPATIENT)
Age: 77
End: 2022-12-28

## 2023-01-26 ENCOUNTER — APPOINTMENT (OUTPATIENT)
Dept: RHEUMATOLOGY | Facility: CLINIC | Age: 78
End: 2023-01-26
Payer: MEDICARE

## 2023-01-26 VITALS
BODY MASS INDEX: 35.36 KG/M2 | DIASTOLIC BLOOD PRESSURE: 80 MMHG | SYSTOLIC BLOOD PRESSURE: 142 MMHG | WEIGHT: 220 LBS | HEART RATE: 75 BPM | HEIGHT: 66 IN | OXYGEN SATURATION: 98 %

## 2023-01-26 PROCEDURE — 36415 COLL VENOUS BLD VENIPUNCTURE: CPT

## 2023-01-26 PROCEDURE — 99214 OFFICE O/P EST MOD 30 MIN: CPT | Mod: 25

## 2023-01-31 NOTE — HISTORY OF PRESENT ILLNESS
[FreeTextEntry1] : She continues taking Humira every other week.  No side effects.  She also takes SSz 2 tabs BID.\par \par She has chronic arthritis in her knees.  No other joint symptoms.\par \par No new inflammation of her eyes.  She continues using one drop of steroids.  She has an appointment in March with Dr. Cordero.\par \par No GI symptoms.

## 2023-02-06 LAB
25(OH)D3 SERPL-MCNC: 44.9 NG/ML
ALBUMIN SERPL ELPH-MCNC: 4.6 G/DL
ALP BLD-CCNC: 102 U/L
ALT SERPL-CCNC: 21 U/L
ANION GAP SERPL CALC-SCNC: 13 MMOL/L
AST SERPL-CCNC: 18 U/L
BASOPHILS # BLD AUTO: 0.03 K/UL
BASOPHILS # BLD AUTO: 0.06 K/UL
BASOPHILS NFR BLD AUTO: 0.5 %
BASOPHILS NFR BLD AUTO: 0.7 %
BILIRUB SERPL-MCNC: 0.2 MG/DL
BUN SERPL-MCNC: 15 MG/DL
CALCIUM SERPL-MCNC: 9.8 MG/DL
CHLORIDE SERPL-SCNC: 102 MMOL/L
CO2 SERPL-SCNC: 26 MMOL/L
CREAT SERPL-MCNC: 0.57 MG/DL
CRP SERPL-MCNC: 3 MG/L
CRP SERPL-MCNC: 4 MG/L
EGFR: 94 ML/MIN/1.73M2
EOSINOPHIL # BLD AUTO: 0.01 K/UL
EOSINOPHIL # BLD AUTO: 0.27 K/UL
EOSINOPHIL NFR BLD AUTO: 0.2 %
EOSINOPHIL NFR BLD AUTO: 3.3 %
ERYTHROCYTE [SEDIMENTATION RATE] IN BLOOD BY WESTERGREN METHOD: 11 MM/HR
ERYTHROCYTE [SEDIMENTATION RATE] IN BLOOD BY WESTERGREN METHOD: 17 MM/HR
GLUCOSE SERPL-MCNC: 138 MG/DL
HCT VFR BLD CALC: 36.7 %
HCT VFR BLD CALC: 37.8 %
HGB BLD-MCNC: 11.8 G/DL
HGB BLD-MCNC: 12.2 G/DL
IMM GRANULOCYTES NFR BLD AUTO: 0 %
IMM GRANULOCYTES NFR BLD AUTO: 0.2 %
LYMPHOCYTES # BLD AUTO: 2.41 K/UL
LYMPHOCYTES # BLD AUTO: 2.91 K/UL
LYMPHOCYTES NFR BLD AUTO: 35.2 %
LYMPHOCYTES NFR BLD AUTO: 40.5 %
MAN DIFF?: NORMAL
MAN DIFF?: NORMAL
MCHC RBC-ENTMCNC: 32.2 GM/DL
MCHC RBC-ENTMCNC: 32.2 PG
MCHC RBC-ENTMCNC: 32.3 GM/DL
MCHC RBC-ENTMCNC: 32.4 PG
MCV RBC AUTO: 100.3 FL
MCV RBC AUTO: 100.3 FL
MONOCYTES # BLD AUTO: 0.63 K/UL
MONOCYTES # BLD AUTO: 0.75 K/UL
MONOCYTES NFR BLD AUTO: 10.6 %
MONOCYTES NFR BLD AUTO: 9.1 %
NEUTROPHILS # BLD AUTO: 2.87 K/UL
NEUTROPHILS # BLD AUTO: 4.26 K/UL
NEUTROPHILS NFR BLD AUTO: 48.2 %
NEUTROPHILS NFR BLD AUTO: 51.5 %
PLATELET # BLD AUTO: 283 K/UL
PLATELET # BLD AUTO: 295 K/UL
POTASSIUM SERPL-SCNC: 4.4 MMOL/L
PROT SERPL-MCNC: 7.3 G/DL
RBC # BLD: 3.66 M/UL
RBC # BLD: 3.77 M/UL
RBC # FLD: 12.9 %
RBC # FLD: 12.9 %
SODIUM SERPL-SCNC: 142 MMOL/L
VIT B12 SERPL-MCNC: 1096 PG/ML
WBC # FLD AUTO: 5.95 K/UL
WBC # FLD AUTO: 8.27 K/UL

## 2023-03-08 ENCOUNTER — RESULT REVIEW (OUTPATIENT)
Age: 78
End: 2023-03-08

## 2023-03-13 ENCOUNTER — APPOINTMENT (OUTPATIENT)
Dept: GERIATRICS | Facility: CLINIC | Age: 78
End: 2023-03-13
Payer: MEDICARE

## 2023-03-13 ENCOUNTER — LABORATORY RESULT (OUTPATIENT)
Age: 78
End: 2023-03-13

## 2023-03-13 ENCOUNTER — NON-APPOINTMENT (OUTPATIENT)
Age: 78
End: 2023-03-13

## 2023-03-13 VITALS — OXYGEN SATURATION: 96 % | HEART RATE: 79 BPM | SYSTOLIC BLOOD PRESSURE: 136 MMHG | DIASTOLIC BLOOD PRESSURE: 70 MMHG

## 2023-03-13 DIAGNOSIS — M54.9 DORSALGIA, UNSPECIFIED: ICD-10-CM

## 2023-03-13 DIAGNOSIS — M47.816 SPONDYLOSIS W/OUT MYELOPATHY OR RADICULOPATHY, LUMBAR REGION: ICD-10-CM

## 2023-03-13 DIAGNOSIS — I10 ESSENTIAL (PRIMARY) HYPERTENSION: ICD-10-CM

## 2023-03-13 DIAGNOSIS — L98.9 DISORDER OF THE SKIN AND SUBCUTANEOUS TISSUE, UNSPECIFIED: ICD-10-CM

## 2023-03-13 DIAGNOSIS — M81.0 AGE-RELATED OSTEOPOROSIS W/OUT CURRENT PATHOLOGICAL FRACTURE: ICD-10-CM

## 2023-03-13 LAB
ESTIMATED AVERAGE GLUCOSE: 134 MG/DL
HBA1C MFR BLD HPLC: 6.3 %

## 2023-03-13 PROCEDURE — G0439: CPT

## 2023-03-13 PROCEDURE — 93000 ELECTROCARDIOGRAM COMPLETE: CPT

## 2023-03-13 NOTE — REVIEW OF SYSTEMS
[As Noted in HPI] : as noted in HPI [Arthralgias] : arthralgias [Negative] : Heme/Lymph [Red Eyes] : eyes not red [Lower Ext Edema] : no lower extremity edema [FreeTextEntry9] : bunion right foot, decreased sensation big toes right [de-identified] : some stress with

## 2023-03-13 NOTE — HISTORY OF PRESENT ILLNESS
[Patient reported osteoporosis screening was abnormal] : Patient reported osteoporosis screening was abnormal [Patient reported colon/rectal cancer screening was abnormal] : Patient reported colon/rectal cancer screening was abnormal [PMH Reviewed and Updated] : past medical history reviewed and updated [Medication and Allergies Reconciled] : medication and allergies reconciled [2] : 2 [Patient reported Patient reported dental screening is normal] : Patient reported dental screening is normal [Patient reported breast cancer screening was normal] : Patient reported breast cancer screening was normal [No falls in past year] : Patient reported no falls in the past year [Completely Independent] : Completely independent. [0] : 2) Feeling down, depressed, or hopeless: Not at all (0) [Over the Past 2 Weeks, Have You Felt Down, Depressed, or Hopeless?] : 1.) Over the past 2 weeks, have you felt down, depressed, or hopeless? No [Over the Past 2 Weeks, Have You Felt Little Interest or Pleasure Doing Things?] : 2.) Over the past 2 weeks, have you felt little interest or pleasure doing things? No [FreeTextEntry1] : Ms Barrera is doing well, here for her annual \par \par some stress at home,  has been dealing with allergies\par she is feeling well herself, biggest complaint is pain in her knees, states does not limit her activities, she remains very active, out this weekend moving tree limb and branches, knees hurt mostly with stairs, not interested in any consultation or intervention\par \par also states sometimes has back pain, feels like gait may be off, no radiculopathy\par  [BreastSonogramDate] : 09/22 [TextBox_25] : osteopenia hip, spine nl [BoneDensityDate] : 09/22 [TextBox_37] : dr liriano-every 6 months, due next week [ColonoscopyDate] : 1/22 [Mammogramdate] : 08/2020 [TextBox_19] : due 08/2023 [FreeTextEntry2] : due-given referral [FreeTextEntry3] : 09/2022 [FreeTextEntry6] : pt encouraged  [[id number]] : 0

## 2023-03-13 NOTE — PHYSICAL EXAM
[General Appearance - Alert] : alert [General Appearance - In No Acute Distress] : in no acute distress [General Appearance - Well-Appearing] : healthy appearing [Sclera] : the sclera and conjunctiva were normal [PERRL With Normal Accommodation] : pupils were equal in size, round, and reactive to light [Extraocular Movements] : extraocular movements were intact [Optic Disc Abnormality] : the optic disc were normal in size and color [Neck Appearance] : the appearance of the neck was normal [Neck Cervical Mass (___cm)] : no neck mass was observed [Jugular Venous Distention Increased] : there was no jugular-venous distention [Respiration, Rhythm And Depth] : normal respiratory rhythm and effort [Auscultation Breath Sounds / Voice Sounds] : lungs were clear to auscultation bilaterally [Chest Palpation] : palpation of the chest revealed no abnormalities [Lungs Percussion] : the lungs were normal to percussion [Apical Impulse] : the apical impulse was normal [Heart Rate And Rhythm] : heart rate was normal and rhythm regular [Heart Sounds] : normal S1 and S2 [Heart Sounds Gallop] : no gallops [Murmurs] : no murmurs [Heart Sounds Pericardial Friction Rub] : no pericardial rub [Arterial Pulses Carotid] : carotid pulses were normal with no bruits [Abdominal Aorta] : the abdominal aorta was normal [Arterial Pulses Femoral] : femoral pulses were normal without bruits [Full Pulse] : the pedal pulses are present [Edema] : there was no peripheral edema [Veins - Varicosity Changes] : there were no varicosital changes [Breast Appearance] : normal in appearance [Breast Palpation Mass] : no palpable masses [Breast Abnormal Lactation (Galactorrhea)] : no nipple discharge [Bowel Sounds] : normal bowel sounds [Abdomen Soft] : soft [Abdomen Mass (___ Cm)] : no abdominal mass palpated [Patient Refused] : rectal exam was refused by the patient [Cervical Lymph Nodes Enlarged Posterior Bilaterally] : posterior cervical [Supraclavicular Lymph Nodes Enlarged Bilaterally] : supraclavicular [Axillary Lymph Nodes Enlarged Bilaterally] : axillary [No CVA Tenderness] : no ~M costovertebral angle tenderness [No Spinal Tenderness] : no spinal tenderness [Abnormal Walk] : normal gait [Nail Clubbing] : no clubbing  or cyanosis of the fingernails [Involuntary Movements] : no involuntary movements were seen [Motor Tone] : muscle strength and tone were normal [] : no rash [Cranial Nerves] : cranial nerves 2-12 were intact [Deep Tendon Reflexes (DTR)] : deep tendon reflexes were 2+ and symmetric [Motor Exam] : the motor exam was normal [No Focal Deficits] : no focal deficits [Oriented To Time, Place, And Person] : oriented to person, place, and time [Impaired Insight] : insight and judgment were intact [Affect] : the affect was normal [Mood] : the mood was normal [Memory Recent] : recent memory was not impaired [Memory Remote] : remote memory was not impaired [Normal Oral Mucosa] : normal oral mucosa [Outer Ear] : the ears and nose were normal in appearance [Hearing Threshold Finger Rub Not Coffee] : hearing was normal [Both Tympanic Membranes Were Examined] : both tympanic membranes were normal [Oropharynx] : The oropharynx was normal [Tactile Decrease Entire Leg Right] : normal right leg [Tactile Decrease Entire Leg Left] : normal left leg [Tactile Decrease Monofilament Wire Test Right Foot] : normal right foot monofilament wire testing [Tactile Decrease Monofilament Wire Test Left Foot] : normal left foot monofilament wire testing [FreeTextEntry1] : sensory intact, mild decreased sensation right great toe mostly lat and dorsum

## 2023-03-13 NOTE — REVIEW OF SYSTEMS
[As Noted in HPI] : as noted in HPI [Arthralgias] : arthralgias [Negative] : Heme/Lymph [Red Eyes] : eyes not red [Lower Ext Edema] : no lower extremity edema [FreeTextEntry9] : bunion right foot, decreased sensation big toes right [de-identified] : some stress with

## 2023-03-13 NOTE — HISTORY OF PRESENT ILLNESS
[Patient reported osteoporosis screening was abnormal] : Patient reported osteoporosis screening was abnormal [Patient reported colon/rectal cancer screening was abnormal] : Patient reported colon/rectal cancer screening was abnormal [PMH Reviewed and Updated] : past medical history reviewed and updated [Medication and Allergies Reconciled] : medication and allergies reconciled [2] : 2 [Patient reported Patient reported dental screening is normal] : Patient reported dental screening is normal [Patient reported breast cancer screening was normal] : Patient reported breast cancer screening was normal [No falls in past year] : Patient reported no falls in the past year [Completely Independent] : Completely independent. [0] : 2) Feeling down, depressed, or hopeless: Not at all (0) [Over the Past 2 Weeks, Have You Felt Down, Depressed, or Hopeless?] : 1.) Over the past 2 weeks, have you felt down, depressed, or hopeless? No [Over the Past 2 Weeks, Have You Felt Little Interest or Pleasure Doing Things?] : 2.) Over the past 2 weeks, have you felt little interest or pleasure doing things? No [FreeTextEntry1] : Ms Barrera is doing well, here for her annual \par \par some stress at home,  has been dealing with allergies\par she is feeling well herself, biggest complaint is pain in her knees, states does not limit her activities, she remains very active, out this weekend moving tree limb and branches, knees hurt mostly with stairs, not interested in any consultation or intervention\par \par also states sometimes has back pain, feels like gait may be off, no radiculopathy\par  [BreastSonogramDate] : 09/22 [TextBox_25] : osteopenia hip, spine nl [BoneDensityDate] : 09/22 [TextBox_37] : dr liriano-every 6 months, due next week [ColonoscopyDate] : 1/22 [Mammogramdate] : 08/2020 [TextBox_19] : due 08/2023 [FreeTextEntry2] : due-given referral [FreeTextEntry3] : 09/2022 [FreeTextEntry6] : pt encouraged  [[id number]] : 0

## 2023-03-13 NOTE — PHYSICAL EXAM
[General Appearance - Alert] : alert [General Appearance - In No Acute Distress] : in no acute distress [General Appearance - Well-Appearing] : healthy appearing [Sclera] : the sclera and conjunctiva were normal [PERRL With Normal Accommodation] : pupils were equal in size, round, and reactive to light [Extraocular Movements] : extraocular movements were intact [Optic Disc Abnormality] : the optic disc were normal in size and color [Neck Appearance] : the appearance of the neck was normal [Neck Cervical Mass (___cm)] : no neck mass was observed [Jugular Venous Distention Increased] : there was no jugular-venous distention [Respiration, Rhythm And Depth] : normal respiratory rhythm and effort [Auscultation Breath Sounds / Voice Sounds] : lungs were clear to auscultation bilaterally [Chest Palpation] : palpation of the chest revealed no abnormalities [Lungs Percussion] : the lungs were normal to percussion [Apical Impulse] : the apical impulse was normal [Heart Rate And Rhythm] : heart rate was normal and rhythm regular [Heart Sounds] : normal S1 and S2 [Heart Sounds Gallop] : no gallops [Murmurs] : no murmurs [Heart Sounds Pericardial Friction Rub] : no pericardial rub [Arterial Pulses Carotid] : carotid pulses were normal with no bruits [Abdominal Aorta] : the abdominal aorta was normal [Arterial Pulses Femoral] : femoral pulses were normal without bruits [Full Pulse] : the pedal pulses are present [Edema] : there was no peripheral edema [Veins - Varicosity Changes] : there were no varicosital changes [Breast Appearance] : normal in appearance [Breast Palpation Mass] : no palpable masses [Breast Abnormal Lactation (Galactorrhea)] : no nipple discharge [Bowel Sounds] : normal bowel sounds [Abdomen Soft] : soft [Abdomen Mass (___ Cm)] : no abdominal mass palpated [Patient Refused] : rectal exam was refused by the patient [Cervical Lymph Nodes Enlarged Posterior Bilaterally] : posterior cervical [Supraclavicular Lymph Nodes Enlarged Bilaterally] : supraclavicular [Axillary Lymph Nodes Enlarged Bilaterally] : axillary [No CVA Tenderness] : no ~M costovertebral angle tenderness [No Spinal Tenderness] : no spinal tenderness [Abnormal Walk] : normal gait [Nail Clubbing] : no clubbing  or cyanosis of the fingernails [Involuntary Movements] : no involuntary movements were seen [Motor Tone] : muscle strength and tone were normal [] : no rash [Cranial Nerves] : cranial nerves 2-12 were intact [Deep Tendon Reflexes (DTR)] : deep tendon reflexes were 2+ and symmetric [Motor Exam] : the motor exam was normal [No Focal Deficits] : no focal deficits [Oriented To Time, Place, And Person] : oriented to person, place, and time [Impaired Insight] : insight and judgment were intact [Affect] : the affect was normal [Mood] : the mood was normal [Memory Recent] : recent memory was not impaired [Memory Remote] : remote memory was not impaired [Normal Oral Mucosa] : normal oral mucosa [Outer Ear] : the ears and nose were normal in appearance [Hearing Threshold Finger Rub Not [place]] : hearing was normal [Both Tympanic Membranes Were Examined] : both tympanic membranes were normal [Oropharynx] : The oropharynx was normal [Tactile Decrease Entire Leg Right] : normal right leg [Tactile Decrease Entire Leg Left] : normal left leg [Tactile Decrease Monofilament Wire Test Right Foot] : normal right foot monofilament wire testing [Tactile Decrease Monofilament Wire Test Left Foot] : normal left foot monofilament wire testing [FreeTextEntry1] : sensory intact, mild decreased sensation right great toe mostly lat and dorsum

## 2023-03-14 LAB — ERYTHROCYTE [SEDIMENTATION RATE] IN BLOOD BY WESTERGREN METHOD: 4 MM/HR

## 2023-03-15 LAB
BASOPHILS # BLD AUTO: 0.05 K/UL
BASOPHILS NFR BLD AUTO: 0.8 %
EOSINOPHIL # BLD AUTO: 0.01 K/UL
EOSINOPHIL NFR BLD AUTO: 0.2 %
HCT VFR BLD CALC: 38.9 %
HGB BLD-MCNC: 11.8 G/DL
IMM GRANULOCYTES NFR BLD AUTO: 0.2 %
LYMPHOCYTES # BLD AUTO: 2.41 K/UL
LYMPHOCYTES NFR BLD AUTO: 37.5 %
MAN DIFF?: NORMAL
MCHC RBC-ENTMCNC: 30.3 GM/DL
MCHC RBC-ENTMCNC: 32.2 PG
MCV RBC AUTO: 106 FL
MONOCYTES # BLD AUTO: 0.7 K/UL
MONOCYTES NFR BLD AUTO: 10.9 %
NEUTROPHILS # BLD AUTO: 3.24 K/UL
NEUTROPHILS NFR BLD AUTO: 50.4 %
PLATELET # BLD AUTO: 292 K/UL
RBC # BLD: 3.67 M/UL
RBC # FLD: 12.9 %
WBC # FLD AUTO: 6.42 K/UL

## 2023-05-05 ENCOUNTER — RX RENEWAL (OUTPATIENT)
Age: 78
End: 2023-05-05

## 2023-05-05 ENCOUNTER — APPOINTMENT (OUTPATIENT)
Dept: RHEUMATOLOGY | Facility: CLINIC | Age: 78
End: 2023-05-05
Payer: MEDICARE

## 2023-05-05 VITALS
DIASTOLIC BLOOD PRESSURE: 80 MMHG | HEART RATE: 70 BPM | BODY MASS INDEX: 35.36 KG/M2 | WEIGHT: 220 LBS | HEIGHT: 66 IN | OXYGEN SATURATION: 96 % | SYSTOLIC BLOOD PRESSURE: 128 MMHG

## 2023-05-05 PROCEDURE — 99214 OFFICE O/P EST MOD 30 MIN: CPT

## 2023-05-08 NOTE — HISTORY OF PRESENT ILLNESS
[FreeTextEntry1] : She continues taking Humira every other week.  No side effects.  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  She continues using one drop of steroids.  S\par \par No GI symptoms.

## 2023-09-01 ENCOUNTER — RX RENEWAL (OUTPATIENT)
Age: 78
End: 2023-09-01

## 2023-09-05 ENCOUNTER — RX RENEWAL (OUTPATIENT)
Age: 78
End: 2023-09-05

## 2023-09-05 RX ORDER — OMEPRAZOLE 40 MG/1
40 CAPSULE, DELAYED RELEASE ORAL
Qty: 90 | Refills: 3 | Status: ACTIVE | COMMUNITY
Start: 2021-11-15 | End: 1900-01-01

## 2023-09-07 ENCOUNTER — RESULT REVIEW (OUTPATIENT)
Age: 78
End: 2023-09-07

## 2023-09-15 ENCOUNTER — APPOINTMENT (OUTPATIENT)
Dept: RHEUMATOLOGY | Facility: CLINIC | Age: 78
End: 2023-09-15
Payer: MEDICARE

## 2023-09-15 VITALS
OXYGEN SATURATION: 96 % | HEART RATE: 75 BPM | RESPIRATION RATE: 14 BRPM | SYSTOLIC BLOOD PRESSURE: 150 MMHG | DIASTOLIC BLOOD PRESSURE: 80 MMHG | HEIGHT: 66 IN

## 2023-09-15 DIAGNOSIS — E53.8 DEFICIENCY OF OTHER SPECIFIED B GROUP VITAMINS: ICD-10-CM

## 2023-09-15 DIAGNOSIS — H20.9 UNSPECIFIED IRIDOCYCLITIS: ICD-10-CM

## 2023-09-15 LAB
25(OH)D3 SERPL-MCNC: 48.4 NG/ML
ALBUMIN SERPL ELPH-MCNC: 4.8 G/DL
ALP BLD-CCNC: 100 U/L
ALT SERPL-CCNC: 25 U/L
ANION GAP SERPL CALC-SCNC: 12 MMOL/L
AST SERPL-CCNC: 24 U/L
BASOPHILS # BLD AUTO: 0.05 K/UL
BASOPHILS NFR BLD AUTO: 0.8 %
BILIRUB SERPL-MCNC: 0.3 MG/DL
BUN SERPL-MCNC: 14 MG/DL
CALCIUM SERPL-MCNC: 10 MG/DL
CHLORIDE SERPL-SCNC: 101 MMOL/L
CO2 SERPL-SCNC: 27 MMOL/L
CREAT SERPL-MCNC: 0.59 MG/DL
CRP SERPL-MCNC: <3 MG/L
EGFR: 92 ML/MIN/1.73M2
EOSINOPHIL # BLD AUTO: 0.18 K/UL
EOSINOPHIL NFR BLD AUTO: 2.9 %
ERYTHROCYTE [SEDIMENTATION RATE] IN BLOOD BY WESTERGREN METHOD: 8 MM/HR
GLUCOSE SERPL-MCNC: 110 MG/DL
HCT VFR BLD CALC: 37.6 %
HGB BLD-MCNC: 12 G/DL
IMM GRANULOCYTES NFR BLD AUTO: 0.2 %
LYMPHOCYTES # BLD AUTO: 2.31 K/UL
LYMPHOCYTES NFR BLD AUTO: 36.7 %
MAN DIFF?: NORMAL
MCHC RBC-ENTMCNC: 31.9 GM/DL
MCHC RBC-ENTMCNC: 32.7 PG
MCV RBC AUTO: 102.5 FL
MONOCYTES # BLD AUTO: 0.66 K/UL
MONOCYTES NFR BLD AUTO: 10.5 %
NEUTROPHILS # BLD AUTO: 3.09 K/UL
NEUTROPHILS NFR BLD AUTO: 48.9 %
PLATELET # BLD AUTO: 310 K/UL
POTASSIUM SERPL-SCNC: 4.8 MMOL/L
PROT SERPL-MCNC: 7.6 G/DL
RBC # BLD: 3.67 M/UL
RBC # FLD: 12.9 %
SODIUM SERPL-SCNC: 140 MMOL/L
WBC # FLD AUTO: 6.3 K/UL

## 2023-09-15 PROCEDURE — 36415 COLL VENOUS BLD VENIPUNCTURE: CPT

## 2023-09-15 PROCEDURE — 99215 OFFICE O/P EST HI 40 MIN: CPT | Mod: 25

## 2023-09-25 PROBLEM — E53.8 VITAMIN B12 DEFICIENCY: Status: ACTIVE | Noted: 2018-11-18

## 2023-09-25 PROBLEM — H20.9 IRITIS: Status: ACTIVE | Noted: 2019-09-16

## 2023-12-13 ENCOUNTER — APPOINTMENT (OUTPATIENT)
Dept: RHEUMATOLOGY | Facility: CLINIC | Age: 78
End: 2023-12-13
Payer: MEDICARE

## 2023-12-13 VITALS
BODY MASS INDEX: 35.36 KG/M2 | DIASTOLIC BLOOD PRESSURE: 68 MMHG | WEIGHT: 220 LBS | OXYGEN SATURATION: 96 % | HEIGHT: 66 IN | SYSTOLIC BLOOD PRESSURE: 150 MMHG | HEART RATE: 74 BPM

## 2023-12-13 DIAGNOSIS — E55.9 VITAMIN D DEFICIENCY, UNSPECIFIED: ICD-10-CM

## 2023-12-13 DIAGNOSIS — D75.89 OTHER SPECIFIED DISEASES OF BLOOD AND BLOOD-FORMING ORGANS: ICD-10-CM

## 2023-12-13 DIAGNOSIS — H20.029 RECURRENT ACUTE IRIDOCYCLITIS, UNSPECIFIED EYE: ICD-10-CM

## 2023-12-13 DIAGNOSIS — R76.8 OTHER SPECIFIED ABNORMAL IMMUNOLOGICAL FINDINGS IN SERUM: ICD-10-CM

## 2023-12-13 LAB
25(OH)D3 SERPL-MCNC: 43.9 NG/ML
ALBUMIN SERPL ELPH-MCNC: 4.7 G/DL
ALP BLD-CCNC: 95 U/L
ALT SERPL-CCNC: 24 U/L
ANION GAP SERPL CALC-SCNC: 14 MMOL/L
AST SERPL-CCNC: 21 U/L
BASOPHILS # BLD AUTO: 0.04 K/UL
BASOPHILS NFR BLD AUTO: 0.6 %
BILIRUB SERPL-MCNC: 0.3 MG/DL
BUN SERPL-MCNC: 12 MG/DL
CALCIUM SERPL-MCNC: 9.7 MG/DL
CHLORIDE SERPL-SCNC: 101 MMOL/L
CO2 SERPL-SCNC: 25 MMOL/L
CREAT SERPL-MCNC: 0.59 MG/DL
CRP SERPL-MCNC: <3 MG/L
EGFR: 92 ML/MIN/1.73M2
EOSINOPHIL # BLD AUTO: 0.46 K/UL
EOSINOPHIL NFR BLD AUTO: 6.9 %
FOLATE SERPL-MCNC: >20 NG/ML
GLUCOSE SERPL-MCNC: 109 MG/DL
HCT VFR BLD CALC: 37.9 %
HGB BLD-MCNC: 12.1 G/DL
IMM GRANULOCYTES NFR BLD AUTO: 0.3 %
LYMPHOCYTES # BLD AUTO: 2.74 K/UL
LYMPHOCYTES NFR BLD AUTO: 40.8 %
MAN DIFF?: NORMAL
MCHC RBC-ENTMCNC: 31.9 GM/DL
MCHC RBC-ENTMCNC: 32 PG
MCV RBC AUTO: 100.3 FL
MONOCYTES # BLD AUTO: 0.65 K/UL
MONOCYTES NFR BLD AUTO: 9.7 %
NEUTROPHILS # BLD AUTO: 2.8 K/UL
NEUTROPHILS NFR BLD AUTO: 41.7 %
PLATELET # BLD AUTO: 290 K/UL
POTASSIUM SERPL-SCNC: 4.6 MMOL/L
PROT SERPL-MCNC: 7.1 G/DL
RBC # BLD: 3.78 M/UL
RBC # FLD: 12.8 %
SODIUM SERPL-SCNC: 140 MMOL/L
VIT B12 SERPL-MCNC: 1008 PG/ML
WBC # FLD AUTO: 6.71 K/UL

## 2023-12-13 PROCEDURE — 99215 OFFICE O/P EST HI 40 MIN: CPT | Mod: 25

## 2023-12-13 PROCEDURE — 36415 COLL VENOUS BLD VENIPUNCTURE: CPT

## 2023-12-14 LAB — ERYTHROCYTE [SEDIMENTATION RATE] IN BLOOD BY WESTERGREN METHOD: 8 MM/HR

## 2023-12-22 ENCOUNTER — RESULT REVIEW (OUTPATIENT)
Age: 78
End: 2023-12-22

## 2023-12-28 ENCOUNTER — APPOINTMENT (OUTPATIENT)
Dept: GERIATRICS | Facility: CLINIC | Age: 78
End: 2023-12-28

## 2023-12-30 PROBLEM — R76.8 POSITIVE ANA (ANTINUCLEAR ANTIBODY): Status: ACTIVE | Noted: 2019-09-16

## 2023-12-30 PROBLEM — H20.029 IRITIS, RECURRENT: Status: ACTIVE | Noted: 2019-10-06

## 2023-12-30 RX ORDER — CHROMIUM 200 MCG
1000 TABLET ORAL
Refills: 0 | Status: ACTIVE | COMMUNITY

## 2023-12-30 RX ORDER — SULFASALAZINE 500 MG/1
500 TABLET, DELAYED RELEASE ORAL
Qty: 360 | Refills: 3 | Status: ACTIVE | COMMUNITY
Start: 2019-10-04

## 2023-12-30 RX ORDER — ADALIMUMAB 40MG/0.4ML
40 KIT SUBCUTANEOUS
Qty: 6 | Refills: 3 | Status: ACTIVE | COMMUNITY
Start: 2020-12-11

## 2023-12-30 NOTE — ASSESSMENT
[FreeTextEntry1] : The patient's current disease and medications (Humira, SSz) necessitate close follow up to assess for progression of the disease and laboratory testing to assess for drug toxicity and response to treatment. Failure to follow up in a timely manner can result in laboratory abnormalities, poorly controlled disease, medication side effects or infectious complications.

## 2023-12-30 NOTE — HISTORY OF PRESENT ILLNESS
[FreeTextEntry1] : She continues taking Humira every other week.  No side effects.  She also takes SSz 2 tabs BID. No GI symptoms.  She is due for a repeat colonoscopy (3 year adam). Nails are normal now. No eye symptoms.  Swelling is decreased.

## 2024-02-14 ENCOUNTER — APPOINTMENT (OUTPATIENT)
Dept: GASTROENTEROLOGY | Facility: CLINIC | Age: 79
End: 2024-02-14
Payer: MEDICARE

## 2024-02-14 VITALS
DIASTOLIC BLOOD PRESSURE: 70 MMHG | OXYGEN SATURATION: 97 % | SYSTOLIC BLOOD PRESSURE: 130 MMHG | WEIGHT: 220 LBS | HEIGHT: 66 IN | BODY MASS INDEX: 35.36 KG/M2 | HEART RATE: 81 BPM | RESPIRATION RATE: 16 BRPM

## 2024-02-14 PROCEDURE — 99214 OFFICE O/P EST MOD 30 MIN: CPT

## 2024-02-14 PROCEDURE — G2211 COMPLEX E/M VISIT ADD ON: CPT

## 2024-02-14 NOTE — PHYSICAL EXAM
[General Appearance - Alert] : alert [General Appearance - In No Acute Distress] : in no acute distress [Sclera] : the sclera and conjunctiva were normal [Outer Ear] : the ears and nose were normal in appearance [Neck Appearance] : the appearance of the neck was normal [] : no respiratory distress [Apical Impulse] : the apical impulse was normal [Abdomen Soft] : soft [Abdomen Tenderness] : non-tender [Skin Color & Pigmentation] : normal skin color and pigmentation [Abnormal Walk] : normal gait [Cranial Nerves] : cranial nerves 2-12 were intact [Oriented To Time, Place, And Person] : oriented to person, place, and time [Impaired Insight] : insight and judgment were intact [Affect] : the affect was normal [Mood] : the mood was normal [FreeTextEntry1] : deferred to upcoming colonoscopy

## 2024-02-14 NOTE — ASSESSMENT
[FreeTextEntry1] : Small bowel stricture/Crohns disease- endoscopic and imaging findings consistent with Crohns disease. Patient currently asymptomatic, but discussed possibility of developing obstructive symptoms requiring surgery.  She started Humira in 12/2020, managed by rheumatologist,  -colonoscopy scheduled for surveillance - Gastric polyps-  path consistent with fundic gland polyps due to PPI.   GERD- controlled on daily ppi, continue   Colon cancer screening, fam hx CRC- 8 mm ta on last colonoscopy 08/2023 , recall in 3-5 yrs  Risks (including but not limited to sedation risks, infection, bleeding, perforation, possibility of missed lesions, spleen injury), benefits and alternatives to procedure, including not doing the procedure, were discussed with patient. Patient understood and agreed to proceed with colonoscopy. Colonoscopy preparation instructions reviewed with patient.

## 2024-02-14 NOTE — HISTORY OF PRESENT ILLNESS
[FreeTextEntry1] : 77 yo f htn/hld/dm2, fam hx CRC (1/2 sister passed in 2020 at age 83) , GERD, h/o diverticulitis, osteoporosis, spondyloarthropathy on sulfasalazine. sb stricture/Crohns now on Humira since Dec 2020 presents for follow up.   today, feeling well. Denies all GI complaints. HB controlled 100% on ppi.  has joint pain, does not take NSAIDS cont Humira (Rx by rheum) every 2 weeks.   08/2021:  CTE unchanged compared to prior except no active inflammation.  EGD/Colonoscopy performed 8/31/20 for GERD/Screening/fam hx CRC-  EGD - multiple gastric polyps -fundic gland polyps, esophagitis, no IM- recommend repeat EGD to complete removal of  multiple >1 cm gastric polyps  Colonoscopy- TI showed stricture, bx with mild villuos blunting, no acute ileitis on bx, adenomatous and hyperplastic polyps, diverticulosis , recall in 3 years   CTE 9/30/20- two short non contiguous areas of mild ileitis involving the distal and terminal ileum. Correlate for clinical and endoscopic evidence of Crohn's disease.   Repeat EGD 11/2020 for h/o gastric polyp, showed gastric polyp, path- fundic gland polyps.   She previously followed with Dr. Lala.   She reports prior episodes of diverticulitis improved with outpatient abx. She had surgical eval with Dr. Hall, but no surgery planned, patient apparently has diverticulosis diffusely.   She has daily regular BM with benefiber.   Patient denies abdominal pain , n/v/ dysphagia/odynophagia, change in bowel habits, diarrhea,  brbpr, melena. no weight loss.  Good appetite and energy level. Patient has daily BM, denies regular NSAID use.      record review: EGD 3/23/16-perforemd for reflux h/o GIM-4mm patch of gastric mucosa at 37 cm, 1 cm above EGJ. thick mucosa at 38 cm, no nodular, soft on biopsy, 2 cm HH, red streaks in gastric antrum, duodenum not examined.  -antral mucosa with mild chronic gastritis, negative for H/ pylori.  -EGJ at 38 cm-mild nonspecific reactive changes, no glandular epithelium, no EoE/dysplasia/malignancy.  -esophagus ay 37 cm- focal acute esophagitis with invading fungal organisms c/w candida, chronic inflammation of cardiac type gastric mucosa, negative for BE, negative for dysplasia, separate fragment of squamous mucosa with minimal reflux changes.    EGD 3/19/2015-for reflux 2 cm HH path- duodenum- normal gastric-strips of antral/body without inflammation. GEJ- fragment of squamocolumnar mucosa with extensive incomplete IM, may represent BE. fundic mucosa with mild chronic nonactive gastritis without IM. , uninflamed squamous mucosa  Colonoscopy 12/17/2015-multiple large diverticula, distorted lumen, retained scybala-prep was excellent except sigmoid colon. recall 7 years.    she believes she had polyps on her first colonoscopy.     fam hx- crc-1/2 sister

## 2024-03-06 ENCOUNTER — NON-APPOINTMENT (OUTPATIENT)
Age: 79
End: 2024-03-06

## 2024-03-17 ENCOUNTER — RESULT REVIEW (OUTPATIENT)
Age: 79
End: 2024-03-17

## 2024-04-01 ENCOUNTER — APPOINTMENT (OUTPATIENT)
Dept: GERIATRICS | Facility: CLINIC | Age: 79
End: 2024-04-01

## 2024-04-01 VITALS
WEIGHT: 220 LBS | OXYGEN SATURATION: 96 % | HEART RATE: 91 BPM | HEIGHT: 66 IN | TEMPERATURE: 96.8 F | DIASTOLIC BLOOD PRESSURE: 78 MMHG | SYSTOLIC BLOOD PRESSURE: 140 MMHG | BODY MASS INDEX: 35.36 KG/M2

## 2024-04-01 LAB
APPEARANCE: CLEAR
BILIRUBIN URINE: NEGATIVE
BLOOD URINE: NEGATIVE
COLOR: NORMAL
GLUCOSE QUALITATIVE U: NEGATIVE MG/DL
KETONES URINE: NEGATIVE MG/DL
LEUKOCYTE ESTERASE URINE: NEGATIVE
NITRITE URINE: NEGATIVE
PH URINE: 5.5
PROTEIN URINE: NEGATIVE MG/DL
SPECIFIC GRAVITY URINE: 1.01
UROBILINOGEN URINE: 0.2 MG/DL

## 2024-04-01 PROCEDURE — 99213 OFFICE O/P EST LOW 20 MIN: CPT

## 2024-04-28 ENCOUNTER — RESULT REVIEW (OUTPATIENT)
Age: 79
End: 2024-04-28

## 2024-04-29 ENCOUNTER — APPOINTMENT (OUTPATIENT)
Dept: GASTROENTEROLOGY | Facility: HOSPITAL | Age: 79
End: 2024-04-29

## 2024-05-15 ENCOUNTER — APPOINTMENT (OUTPATIENT)
Dept: GERIATRICS | Facility: CLINIC | Age: 79
End: 2024-05-15
Payer: MEDICARE

## 2024-05-15 VITALS
OXYGEN SATURATION: 95 % | BODY MASS INDEX: 35.83 KG/M2 | TEMPERATURE: 97.5 F | HEART RATE: 83 BPM | DIASTOLIC BLOOD PRESSURE: 70 MMHG | SYSTOLIC BLOOD PRESSURE: 142 MMHG | WEIGHT: 222 LBS

## 2024-05-15 DIAGNOSIS — M47.819 SPONDYLOSIS W/OUT MYELOPATHY OR RADICULOPATHY, SITE UNSPECIFIED: ICD-10-CM

## 2024-05-15 DIAGNOSIS — H25.9 UNSPECIFIED AGE-RELATED CATARACT: ICD-10-CM

## 2024-05-15 DIAGNOSIS — E11.9 TYPE 2 DIABETES MELLITUS W/OUT COMPLICATIONS: ICD-10-CM

## 2024-05-15 PROCEDURE — 99214 OFFICE O/P EST MOD 30 MIN: CPT

## 2024-05-15 RX ORDER — ADALIMUMAB 80MG/0.8ML
80 KIT SUBCUTANEOUS
Qty: 1 | Refills: 0 | Status: COMPLETED | COMMUNITY
Start: 2020-11-12 | End: 2024-05-15

## 2024-05-15 RX ORDER — ADALIMUMAB 40MG/0.4ML
40 KIT SUBCUTANEOUS
Qty: 3 | Refills: 6 | Status: COMPLETED | COMMUNITY
Start: 2020-11-12 | End: 2024-05-15

## 2024-05-15 NOTE — PHYSICAL EXAM
[No Acute Distress] : no acute distress [Normal Sclera/Conjunctiva] : normal sclera/conjunctiva [PERRL] : pupils equal round and reactive to light [EOMI] : extraocular movements intact [Normal Outer Ear/Nose] : the outer ears and nose were normal in appearance [No Lymphadenopathy] : no lymphadenopathy [Supple] : supple [No Respiratory Distress] : no respiratory distress  [No Accessory Muscle Use] : no accessory muscle use [Clear to Auscultation] : lungs were clear to auscultation bilaterally [Normal Rate] : normal rate  [No Carotid Bruits] : no carotid bruits [Soft] : abdomen soft [Non Tender] : non-tender [Normal Bowel Sounds] : normal bowel sounds [Grossly Normal Strength/Tone] : grossly normal strength/tone [No Focal Deficits] : no focal deficits [Alert and Oriented x3] : oriented to person, place, and time [de-identified] : Bilateral cataract surgery

## 2024-05-15 NOTE — ASSESSMENT
[High Risk Surgery - Intraperitoneal, Intrathoracic or Supringuinal Vascular Procedures] : High Risk Surgery - Intraperitoneal, Intrathoracic or Supringuinal Vascular Procedures - No (0) [Ischemic Heart Disease] : Ischemic Heart Disease - No (0) [Congestive Heart Failure] : Congestive Heart Failure - No (0) [Prior Cerebrovascular Accident or TIA] : Prior Cerebrovascular Accident or TIA - No (0) [Creatinine >= 2mg/dL (1 Point)] : Creatinine >= 2mg/dL - No (0) [Insulin-dependent Diabetic (1 Point)] : Insulin-dependent Diabetic - No (0) [0] : 0 , RCRI Class: I, Risk of Post-Op Cardiac Complications: 3.9%, 95% CI for Risk Estimate: 2.8% - 5.4% [Patient Optimized for Surgery] : Patient optimized for surgery [No Further Testing Recommended] : no further testing recommended [Modify medications prior to procedure] : Modify medications prior to procedure [As per surgery] : as per surgery [FreeTextEntry4] : Patient with no cardiopulmonary symptoms.  She has been off Humira for one week, will hold until procedure and 2 weeks after.  Last A1c 6.9%, attempting LSM to big back down.  There are no medical contraindications to the planned procedure, may proceed with acceptable cardiac risk.  [FreeTextEntry7] : Hold Humira 2 weeks pre/post procedure.

## 2024-05-15 NOTE — REVIEW OF SYSTEMS
[Vision Problems] : vision problems [Joint Pain] : joint pain [Back Pain] : back pain [Negative] : Heme/Lymph [FreeTextEntry3] : Bilateral cataracts

## 2024-05-15 NOTE — HISTORY OF PRESENT ILLNESS
[Aortic Stenosis] : no aortic stenosis [Atrial Fibrillation] : no atrial fibrillation [Coronary Artery Disease] : no coronary artery disease [Recent Myocardial Infarction] : no recent myocardial infarction [Implantable Device/Pacemaker] : no implantable device/pacemaker [Asthma] : no asthma [COPD] : no COPD [Sleep Apnea] : no sleep apnea [Smoker] : not a smoker [Self] : no previous adverse anesthesia reaction [Chronic Anticoagulation] : no chronic anticoagulation [Chronic Kidney Disease] : no chronic kidney disease [Diabetes] : no diabetes [(Patient denies any chest pain, claudication, dyspnea on exertion, orthopnea, palpitations or syncope)] : Patient denies any chest pain, claudication, dyspnea on exertion, orthopnea, palpitations or syncope [FreeTextEntry1] : Bilateral cataract surgery [FreeTextEntry2] : 5/22/24 ; 6/5/24 [FreeTextEntry3] : Dr. Haider [FreeTextEntry4] : 78 year old female with a history of GERd, Crohns, DM, DJD, HTN, osteoporosis who presents today for a preoperative examination.   Blurry vision due to cataracts, going for extraction, left eye first.   Denies cardiopulmonay symptoms.  [FreeTextEntry5] : Previous reaction with propofol

## 2024-05-17 ENCOUNTER — RESULT REVIEW (OUTPATIENT)
Age: 79
End: 2024-05-17

## 2024-05-21 RX ORDER — METOPROLOL SUCCINATE 25 MG/1
25 TABLET, EXTENDED RELEASE ORAL
Qty: 180 | Refills: 0 | Status: ACTIVE | COMMUNITY
Start: 2020-12-09 | End: 1900-01-01

## 2024-05-22 ENCOUNTER — TRANSCRIPTION ENCOUNTER (OUTPATIENT)
Age: 79
End: 2024-05-22

## 2024-06-03 ENCOUNTER — NON-APPOINTMENT (OUTPATIENT)
Age: 79
End: 2024-06-03

## 2024-06-07 ENCOUNTER — RESULT REVIEW (OUTPATIENT)
Age: 79
End: 2024-06-07

## 2024-06-10 ENCOUNTER — APPOINTMENT (OUTPATIENT)
Dept: GERIATRICS | Facility: CLINIC | Age: 79
End: 2024-06-10

## 2024-06-10 VITALS
OXYGEN SATURATION: 95 % | TEMPERATURE: 97.3 F | SYSTOLIC BLOOD PRESSURE: 132 MMHG | HEART RATE: 76 BPM | DIASTOLIC BLOOD PRESSURE: 82 MMHG | HEIGHT: 66 IN | WEIGHT: 216 LBS | BODY MASS INDEX: 34.72 KG/M2

## 2024-06-10 PROCEDURE — 99213 OFFICE O/P EST LOW 20 MIN: CPT

## 2024-06-12 ENCOUNTER — TRANSCRIPTION ENCOUNTER (OUTPATIENT)
Age: 79
End: 2024-06-12

## 2024-06-12 ENCOUNTER — APPOINTMENT (OUTPATIENT)
Dept: GASTROENTEROLOGY | Facility: CLINIC | Age: 79
End: 2024-06-12
Payer: MEDICARE

## 2024-06-12 VITALS
WEIGHT: 216 LBS | SYSTOLIC BLOOD PRESSURE: 132 MMHG | HEIGHT: 66 IN | DIASTOLIC BLOOD PRESSURE: 62 MMHG | OXYGEN SATURATION: 95 % | BODY MASS INDEX: 34.72 KG/M2 | HEART RATE: 76 BPM

## 2024-06-12 DIAGNOSIS — K21.9 GASTRO-ESOPHAGEAL REFLUX DISEASE W/OUT ESOPHAGITIS: ICD-10-CM

## 2024-06-12 DIAGNOSIS — K50.018 CROHN'S DISEASE OF SMALL INTESTINE WITH OTHER COMPLICATION: ICD-10-CM

## 2024-06-12 DIAGNOSIS — Z12.11 ENCOUNTER FOR SCREENING FOR MALIGNANT NEOPLASM OF COLON: ICD-10-CM

## 2024-06-12 DIAGNOSIS — R10.13 EPIGASTRIC PAIN: ICD-10-CM

## 2024-06-12 PROCEDURE — 99214 OFFICE O/P EST MOD 30 MIN: CPT

## 2024-06-12 NOTE — ASSESSMENT
[FreeTextEntry1] : Small bowel stricture/Crohns disease- endoscopic and imaging findings consistent with Crohns disease. Patient always asymptomatic, no evidence of disease on recent CTE -cont Humira- rx by rheum   Gastric polyps-  path consistent with fundic gland polyps due to PPI.   GERD- controlled on daily ppi, continue   dyspepsia- likely postinfectious in light of likely infectious gastroenteritis occuring 3 days after recent colonoscopy CTE 1-2 weeks later unrevealing.  -counseled patient on small frequent meals.  -if persistent or worsening symptoms patient instructed to call   Colon cancer screening, fam hx CRC- 8 mm ta on last colonoscopy 08/2020 , incomplete colonoscopy 04/2024 recall for colonoscopy if clinically indicated   RTC in 3 months or sooner if needed.

## 2024-06-12 NOTE — HISTORY OF PRESENT ILLNESS
[FreeTextEntry1] : 77 yo f htn/hld/dm2, fam hx CRC (1/2 sister passed in 2020 at age 83) , GERD, h/o diverticulitis, osteoporosis, iritis, spondyloarthropathy on sulfasalazine. sb stricture/Crohns now on Humira since Dec 2020 presents for follow up.   Colonoscopy for Crohns, screening 04/2024- diverticulosis, 4mm rectal polyp, int hemorrhoids, marked luminal narrowing. scope could not be advanced beyond sigmoid.   CTE  small hh severe sigmoid tics.  IMPRESSION:  No evidence of Crohn's disease or other acute bowel abnormality.  3 days after colonoscopy n/v/d, fevers, 48 hours. unable to eat x 4-5 days. lost 5 lbs some intermittent nausea. bm back to normal.  appetite not back to normal.  she has had 2 eye surgeris.  she has 2 dental implants on hold she has held Humira for cataract surgery.   she is on daily ppi she takes daily prebiotic/fiber.   today, feeling well. Denies all GI complaints. HB controlled 100% on ppi.  has joint pain, does not take NSAIDS cont Humira (Rx by rheum) every 2 weeks.   08/2021:  CTE unchanged compared to prior except no active inflammation.  EGD/Colonoscopy performed 8/31/20 for GERD/Screening/fam hx CRC-  EGD - multiple gastric polyps -fundic gland polyps, esophagitis, no IM- recommend repeat EGD to complete removal of  multiple >1 cm gastric polyps  Colonoscopy- TI showed stricture, bx with mild villuos blunting, no acute ileitis on bx, adenomatous and hyperplastic polyps, diverticulosis , recall in 3 years   CTE 9/30/20- two short non contiguous areas of mild ileitis involving the distal and terminal ileum. Correlate for clinical and endoscopic evidence of Crohn's disease.   Repeat EGD 11/2020 for h/o gastric polyp, showed gastric polyp, path- fundic gland polyps.   She previously followed with Dr. Lala.   She reports prior episodes of diverticulitis improved with outpatient abx. She had surgical eval with Dr. Hall, but no surgery planned, patient apparently has diverticulosis diffusely.   She has daily regular BM with benefiber.   Patient denies abdominal pain , n/v/ dysphagia/odynophagia, change in bowel habits, diarrhea,  brbpr, melena. no weight loss.  Good appetite and energy level. Patient has daily BM, denies regular NSAID use.      record review: EGD 3/23/16-perforemd for reflux h/o GIM-4mm patch of gastric mucosa at 37 cm, 1 cm above EGJ. thick mucosa at 38 cm, no nodular, soft on biopsy, 2 cm HH, red streaks in gastric antrum, duodenum not examined.  -antral mucosa with mild chronic gastritis, negative for H/ pylori.  -EGJ at 38 cm-mild nonspecific reactive changes, no glandular epithelium, no EoE/dysplasia/malignancy.  -esophagus ay 37 cm- focal acute esophagitis with invading fungal organisms c/w candida, chronic inflammation of cardiac type gastric mucosa, negative for BE, negative for dysplasia, separate fragment of squamous mucosa with minimal reflux changes.    EGD 3/19/2015-for reflux 2 cm HH path- duodenum- normal gastric-strips of antral/body without inflammation. GEJ- fragment of squamocolumnar mucosa with extensive incomplete IM, may represent BE. fundic mucosa with mild chronic nonactive gastritis without IM. , uninflamed squamous mucosa  Colonoscopy 12/17/2015-multiple large diverticula, distorted lumen, retained scybala-prep was excellent except sigmoid colon. recall 7 years.    she believes she had polyps on her first colonoscopy.     fam hx- crc-1/2 sister

## 2024-08-14 ENCOUNTER — RX RENEWAL (OUTPATIENT)
Age: 79
End: 2024-08-14

## 2024-09-13 ENCOUNTER — RESULT REVIEW (OUTPATIENT)
Age: 79
End: 2024-09-13

## 2024-09-16 ENCOUNTER — APPOINTMENT (OUTPATIENT)
Dept: GERIATRICS | Facility: CLINIC | Age: 79
End: 2024-09-16

## 2024-09-16 VITALS
WEIGHT: 218 LBS | SYSTOLIC BLOOD PRESSURE: 136 MMHG | HEART RATE: 72 BPM | OXYGEN SATURATION: 96 % | BODY MASS INDEX: 35.03 KG/M2 | HEIGHT: 66 IN | TEMPERATURE: 97.5 F | DIASTOLIC BLOOD PRESSURE: 68 MMHG

## 2024-09-16 DIAGNOSIS — E11.9 TYPE 2 DIABETES MELLITUS W/OUT COMPLICATIONS: ICD-10-CM

## 2024-09-16 DIAGNOSIS — I10 ESSENTIAL (PRIMARY) HYPERTENSION: ICD-10-CM

## 2024-09-16 DIAGNOSIS — R73.09 OTHER ABNORMAL GLUCOSE: ICD-10-CM

## 2024-09-16 PROCEDURE — 99212 OFFICE O/P EST SF 10 MIN: CPT

## 2024-09-19 ENCOUNTER — RESULT REVIEW (OUTPATIENT)
Age: 79
End: 2024-09-19

## 2024-10-16 ENCOUNTER — RESULT REVIEW (OUTPATIENT)
Age: 79
End: 2024-10-16

## 2024-10-16 ENCOUNTER — APPOINTMENT (OUTPATIENT)
Dept: GASTROENTEROLOGY | Facility: CLINIC | Age: 79
End: 2024-10-16
Payer: MEDICARE

## 2024-10-16 VITALS
OXYGEN SATURATION: 98 % | WEIGHT: 218 LBS | BODY MASS INDEX: 35.03 KG/M2 | SYSTOLIC BLOOD PRESSURE: 142 MMHG | DIASTOLIC BLOOD PRESSURE: 78 MMHG | HEIGHT: 66 IN | HEART RATE: 78 BPM

## 2024-10-16 DIAGNOSIS — R07.81 PLEURODYNIA: ICD-10-CM

## 2024-10-16 DIAGNOSIS — R10.32 LEFT LOWER QUADRANT PAIN: ICD-10-CM

## 2024-10-16 DIAGNOSIS — G89.29 LEFT LOWER QUADRANT PAIN: ICD-10-CM

## 2024-10-16 PROCEDURE — G2211 COMPLEX E/M VISIT ADD ON: CPT

## 2024-10-16 PROCEDURE — 99214 OFFICE O/P EST MOD 30 MIN: CPT

## 2024-10-21 LAB
ALBUMIN SERPL ELPH-MCNC: 4.7 G/DL
ALP BLD-CCNC: 95 U/L
ALT SERPL-CCNC: 22 U/L
ANION GAP SERPL CALC-SCNC: 14 MMOL/L
AST SERPL-CCNC: 20 U/L
BASOPHILS # BLD AUTO: 0.04 K/UL
BASOPHILS NFR BLD AUTO: 0.6 %
BILIRUB SERPL-MCNC: 0.3 MG/DL
BUN SERPL-MCNC: 15 MG/DL
CALCIUM SERPL-MCNC: 9.5 MG/DL
CHLORIDE SERPL-SCNC: 102 MMOL/L
CO2 SERPL-SCNC: 25 MMOL/L
CREAT SERPL-MCNC: 0.63 MG/DL
EGFR: 90 ML/MIN/1.73M2
EOSINOPHIL # BLD AUTO: 0.06 K/UL
EOSINOPHIL NFR BLD AUTO: 0.9 %
FERRITIN SERPL-MCNC: 330 NG/ML
FOLATE SERPL-MCNC: >20 NG/ML
GLUCOSE SERPL-MCNC: 77 MG/DL
HCT VFR BLD CALC: 34.9 %
HGB BLD-MCNC: 11.5 G/DL
IMM GRANULOCYTES NFR BLD AUTO: 0.1 %
IRON SATN MFR SERPL: 32 %
IRON SERPL-MCNC: 106 UG/DL
LYMPHOCYTES # BLD AUTO: 2.81 K/UL
LYMPHOCYTES NFR BLD AUTO: 42.1 %
MAN DIFF?: NORMAL
MCHC RBC-ENTMCNC: 32.7 PG
MCHC RBC-ENTMCNC: 33 GM/DL
MCV RBC AUTO: 99.1 FL
MONOCYTES # BLD AUTO: 0.64 K/UL
MONOCYTES NFR BLD AUTO: 9.6 %
NEUTROPHILS # BLD AUTO: 3.12 K/UL
NEUTROPHILS NFR BLD AUTO: 46.7 %
PLATELET # BLD AUTO: 311 K/UL
POTASSIUM SERPL-SCNC: 4.7 MMOL/L
PROT SERPL-MCNC: 7.4 G/DL
RBC # BLD: 3.52 M/UL
RBC # FLD: 13.4 %
SODIUM SERPL-SCNC: 141 MMOL/L
TIBC SERPL-MCNC: 330 UG/DL
UIBC SERPL-MCNC: 224 UG/DL
VIT B12 SERPL-MCNC: 989 PG/ML
WBC # FLD AUTO: 6.68 K/UL

## 2024-10-28 ENCOUNTER — RESULT REVIEW (OUTPATIENT)
Age: 79
End: 2024-10-28

## 2024-12-02 DIAGNOSIS — S22.49XA MULTIPLE FRACTURES OF RIBS, UNSPECIFIED SIDE, INITIAL ENCOUNTER FOR CLOSED FRACTURE: ICD-10-CM

## 2024-12-11 ENCOUNTER — RESULT REVIEW (OUTPATIENT)
Age: 79
End: 2024-12-11

## 2024-12-13 ENCOUNTER — RESULT REVIEW (OUTPATIENT)
Age: 79
End: 2024-12-13

## 2024-12-16 ENCOUNTER — APPOINTMENT (OUTPATIENT)
Dept: GERIATRICS | Facility: CLINIC | Age: 79
End: 2024-12-16

## 2024-12-16 VITALS
TEMPERATURE: 97.1 F | OXYGEN SATURATION: 95 % | SYSTOLIC BLOOD PRESSURE: 142 MMHG | HEART RATE: 75 BPM | DIASTOLIC BLOOD PRESSURE: 76 MMHG | HEIGHT: 66 IN

## 2024-12-16 DIAGNOSIS — H20.9 UNSPECIFIED IRIDOCYCLITIS: ICD-10-CM

## 2024-12-16 DIAGNOSIS — R60.0 LOCALIZED EDEMA: ICD-10-CM

## 2024-12-16 DIAGNOSIS — K57.30 DIVERTICULOSIS OF LARGE INTESTINE W/OUT PERFORATION OR ABSCESS W/OUT BLEEDING: ICD-10-CM

## 2024-12-16 DIAGNOSIS — E53.8 DEFICIENCY OF OTHER SPECIFIED B GROUP VITAMINS: ICD-10-CM

## 2024-12-16 DIAGNOSIS — M47.816 SPONDYLOSIS W/OUT MYELOPATHY OR RADICULOPATHY, LUMBAR REGION: ICD-10-CM

## 2024-12-16 DIAGNOSIS — I10 ESSENTIAL (PRIMARY) HYPERTENSION: ICD-10-CM

## 2024-12-16 PROCEDURE — 99213 OFFICE O/P EST LOW 20 MIN: CPT

## 2024-12-18 ENCOUNTER — NON-APPOINTMENT (OUTPATIENT)
Age: 79
End: 2024-12-18

## 2024-12-23 ENCOUNTER — APPOINTMENT (OUTPATIENT)
Dept: SURGERY | Facility: CLINIC | Age: 79
End: 2024-12-23
Payer: MEDICARE

## 2024-12-23 VITALS
WEIGHT: 220 LBS | SYSTOLIC BLOOD PRESSURE: 165 MMHG | BODY MASS INDEX: 35.36 KG/M2 | HEIGHT: 66 IN | DIASTOLIC BLOOD PRESSURE: 78 MMHG

## 2024-12-23 DIAGNOSIS — E04.2 NONTOXIC MULTINODULAR GOITER: ICD-10-CM

## 2024-12-23 PROCEDURE — 99204 OFFICE O/P NEW MOD 45 MIN: CPT

## 2024-12-25 PROBLEM — F10.90 ALCOHOL USE: Status: ACTIVE | Noted: 2018-11-18

## 2024-12-30 ENCOUNTER — RESULT REVIEW (OUTPATIENT)
Age: 79
End: 2024-12-30

## 2025-01-06 ENCOUNTER — NON-APPOINTMENT (OUTPATIENT)
Age: 80
End: 2025-01-06

## 2025-02-26 ENCOUNTER — APPOINTMENT (OUTPATIENT)
Dept: GASTROENTEROLOGY | Facility: CLINIC | Age: 80
End: 2025-02-26
Payer: MEDICARE

## 2025-02-26 VITALS
BODY MASS INDEX: 35.36 KG/M2 | SYSTOLIC BLOOD PRESSURE: 140 MMHG | HEIGHT: 66 IN | DIASTOLIC BLOOD PRESSURE: 60 MMHG | HEART RATE: 76 BPM | OXYGEN SATURATION: 99 % | WEIGHT: 220 LBS

## 2025-02-26 DIAGNOSIS — Z12.11 ENCOUNTER FOR SCREENING FOR MALIGNANT NEOPLASM OF COLON: ICD-10-CM

## 2025-02-26 DIAGNOSIS — S22.49XA MULTIPLE FRACTURES OF RIBS, UNSPECIFIED SIDE, INITIAL ENCOUNTER FOR CLOSED FRACTURE: ICD-10-CM

## 2025-02-26 DIAGNOSIS — R79.89 OTHER SPECIFIED ABNORMAL FINDINGS OF BLOOD CHEMISTRY: ICD-10-CM

## 2025-02-26 DIAGNOSIS — K21.9 GASTRO-ESOPHAGEAL REFLUX DISEASE W/OUT ESOPHAGITIS: ICD-10-CM

## 2025-02-26 DIAGNOSIS — E04.2 NONTOXIC MULTINODULAR GOITER: ICD-10-CM

## 2025-02-26 DIAGNOSIS — K76.0 FATTY (CHANGE OF) LIVER, NOT ELSEWHERE CLASSIFIED: ICD-10-CM

## 2025-02-26 DIAGNOSIS — K50.018 CROHN'S DISEASE OF SMALL INTESTINE WITH OTHER COMPLICATION: ICD-10-CM

## 2025-02-26 PROCEDURE — G2211 COMPLEX E/M VISIT ADD ON: CPT

## 2025-02-26 PROCEDURE — 99214 OFFICE O/P EST MOD 30 MIN: CPT

## 2025-03-06 LAB — TM INTERPRETATION: NORMAL

## 2025-03-21 ENCOUNTER — RESULT REVIEW (OUTPATIENT)
Age: 80
End: 2025-03-21

## 2025-03-24 ENCOUNTER — APPOINTMENT (OUTPATIENT)
Dept: GERIATRICS | Facility: CLINIC | Age: 80
End: 2025-03-24

## 2025-03-24 PROCEDURE — G0439: CPT

## 2025-04-30 ENCOUNTER — RESULT REVIEW (OUTPATIENT)
Age: 80
End: 2025-04-30

## 2025-04-30 DIAGNOSIS — G89.29 LEFT LOWER QUADRANT PAIN: ICD-10-CM

## 2025-04-30 DIAGNOSIS — R10.32 LEFT LOWER QUADRANT PAIN: ICD-10-CM

## 2025-05-01 ENCOUNTER — RESULT REVIEW (OUTPATIENT)
Age: 80
End: 2025-05-01

## 2025-05-01 ENCOUNTER — APPOINTMENT (OUTPATIENT)
Dept: GASTROENTEROLOGY | Facility: HOSPITAL | Age: 80
End: 2025-05-01

## 2025-05-01 ENCOUNTER — TRANSCRIPTION ENCOUNTER (OUTPATIENT)
Age: 80
End: 2025-05-01

## 2025-05-06 DIAGNOSIS — E11.9 TYPE 2 DIABETES MELLITUS W/OUT COMPLICATIONS: ICD-10-CM

## 2025-05-06 DIAGNOSIS — I10 ESSENTIAL (PRIMARY) HYPERTENSION: ICD-10-CM

## 2025-05-06 DIAGNOSIS — R79.89 OTHER SPECIFIED ABNORMAL FINDINGS OF BLOOD CHEMISTRY: ICD-10-CM

## 2025-05-08 ENCOUNTER — RESULT REVIEW (OUTPATIENT)
Age: 80
End: 2025-05-08

## 2025-05-12 ENCOUNTER — APPOINTMENT (OUTPATIENT)
Dept: GERIATRICS | Facility: CLINIC | Age: 80
End: 2025-05-12

## 2025-05-12 VITALS
SYSTOLIC BLOOD PRESSURE: 150 MMHG | HEART RATE: 73 BPM | TEMPERATURE: 96.9 F | DIASTOLIC BLOOD PRESSURE: 69 MMHG | HEIGHT: 66 IN | OXYGEN SATURATION: 93 %

## 2025-06-19 ENCOUNTER — APPOINTMENT (OUTPATIENT)
Dept: HEPATOLOGY | Facility: CLINIC | Age: 80
End: 2025-06-19
Payer: MEDICARE

## 2025-06-19 VITALS
HEIGHT: 66 IN | WEIGHT: 216 LBS | BODY MASS INDEX: 34.72 KG/M2 | DIASTOLIC BLOOD PRESSURE: 80 MMHG | SYSTOLIC BLOOD PRESSURE: 140 MMHG

## 2025-06-19 PROBLEM — R73.03 PREDIABETES: Status: ACTIVE | Noted: 2025-06-19

## 2025-06-19 PROBLEM — K76.0 METABOLIC DYSFUNCTION-ASSOCIATED STEATOTIC LIVER DISEASE (MASLD): Status: ACTIVE | Noted: 2025-06-19

## 2025-06-19 PROBLEM — I70.90 ATHEROSCLEROSIS: Status: ACTIVE | Noted: 2025-06-19

## 2025-06-19 PROCEDURE — 99214 OFFICE O/P EST MOD 30 MIN: CPT

## 2025-06-19 RX ORDER — MAGNESIUM OXIDE/MAG AA CHELATE 300 MG
CAPSULE ORAL
Refills: 0 | Status: ACTIVE | COMMUNITY

## 2025-06-26 ENCOUNTER — RX RENEWAL (OUTPATIENT)
Age: 80
End: 2025-06-26

## 2025-07-11 PROBLEM — R73.9 HYPERGLYCEMIA: Status: ACTIVE | Noted: 2025-07-10

## 2025-08-25 ENCOUNTER — RESULT REVIEW (OUTPATIENT)
Age: 80
End: 2025-08-25

## 2025-08-25 ENCOUNTER — APPOINTMENT (OUTPATIENT)
Dept: GASTROENTEROLOGY | Facility: HOSPITAL | Age: 80
End: 2025-08-25

## 2025-09-18 DIAGNOSIS — Z13.820 ENCOUNTER FOR SCREENING FOR OSTEOPOROSIS: ICD-10-CM

## 2025-09-18 DIAGNOSIS — Z12.39 ENCOUNTER FOR OTHER SCREENING FOR MALIGNANT NEOPLASM OF BREAST: ICD-10-CM
